# Patient Record
Sex: MALE | Race: WHITE | Employment: FULL TIME | URBAN - METROPOLITAN AREA
[De-identification: names, ages, dates, MRNs, and addresses within clinical notes are randomized per-mention and may not be internally consistent; named-entity substitution may affect disease eponyms.]

---

## 2023-10-31 ENCOUNTER — HOSPITAL ENCOUNTER (EMERGENCY)
Age: 28
Discharge: HOME | End: 2023-11-01
Attending: EMERGENCY MEDICINE
Payer: COMMERCIAL
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
## 2023-10-31 DIAGNOSIS — S61.309A AVULSION OF FINGERNAIL, INITIAL ENCOUNTER: ICD-10-CM

## 2023-10-31 PROCEDURE — 99284 EMERGENCY DEPT VISIT MOD MDM: CPT

## 2023-10-31 PROCEDURE — 12001 RPR S/N/AX/GEN/TRNK 2.5CM/<: CPT

## 2023-11-01 ENCOUNTER — APPOINTMENT (OUTPATIENT)
Dept: GENERAL RADIOLOGY | Age: 28
End: 2023-11-01
Payer: COMMERCIAL

## 2023-11-01 VITALS
HEIGHT: 71 IN | DIASTOLIC BLOOD PRESSURE: 84 MMHG | HEART RATE: 100 BPM | WEIGHT: 158 LBS | RESPIRATION RATE: 20 BRPM | TEMPERATURE: 98.3 F | BODY MASS INDEX: 22.12 KG/M2 | OXYGEN SATURATION: 100 % | SYSTOLIC BLOOD PRESSURE: 135 MMHG

## 2023-11-01 PROCEDURE — 90715 TDAP VACCINE 7 YRS/> IM: CPT | Performed by: STUDENT IN AN ORGANIZED HEALTH CARE EDUCATION/TRAINING PROGRAM

## 2023-11-01 PROCEDURE — 2500000003 HC RX 250 WO HCPCS: Performed by: STUDENT IN AN ORGANIZED HEALTH CARE EDUCATION/TRAINING PROGRAM

## 2023-11-01 PROCEDURE — 73130 X-RAY EXAM OF HAND: CPT

## 2023-11-01 PROCEDURE — 90471 IMMUNIZATION ADMIN: CPT | Performed by: STUDENT IN AN ORGANIZED HEALTH CARE EDUCATION/TRAINING PROGRAM

## 2023-11-01 PROCEDURE — 6370000000 HC RX 637 (ALT 250 FOR IP): Performed by: STUDENT IN AN ORGANIZED HEALTH CARE EDUCATION/TRAINING PROGRAM

## 2023-11-01 PROCEDURE — 6360000002 HC RX W HCPCS: Performed by: STUDENT IN AN ORGANIZED HEALTH CARE EDUCATION/TRAINING PROGRAM

## 2023-11-01 RX ORDER — ACETAMINOPHEN 500 MG
1000 TABLET ORAL
Status: COMPLETED | OUTPATIENT
Start: 2023-11-01 | End: 2023-11-01

## 2023-11-01 RX ORDER — AMOXICILLIN AND CLAVULANATE POTASSIUM 875; 125 MG/1; MG/1
1 TABLET, FILM COATED ORAL 2 TIMES DAILY
Qty: 14 TABLET | Refills: 0 | Status: SHIPPED | OUTPATIENT
Start: 2023-11-01 | End: 2023-11-08

## 2023-11-01 RX ORDER — LIDOCAINE HYDROCHLORIDE 10 MG/ML
20 INJECTION, SOLUTION INFILTRATION; PERINEURAL ONCE
Status: COMPLETED | OUTPATIENT
Start: 2023-11-01 | End: 2023-11-01

## 2023-11-01 RX ORDER — SERTRALINE HYDROCHLORIDE 100 MG/1
200 TABLET, FILM COATED ORAL DAILY
COMMUNITY

## 2023-11-01 RX ORDER — AMOXICILLIN AND CLAVULANATE POTASSIUM 875; 125 MG/1; MG/1
1 TABLET, FILM COATED ORAL ONCE
Status: COMPLETED | OUTPATIENT
Start: 2023-11-01 | End: 2023-11-01

## 2023-11-01 RX ADMIN — Medication 3 ML: at 01:18

## 2023-11-01 RX ADMIN — LIDOCAINE HYDROCHLORIDE 20 ML: 10 INJECTION, SOLUTION INFILTRATION; PERINEURAL at 00:34

## 2023-11-01 RX ADMIN — TETANUS TOXOID, REDUCED DIPHTHERIA TOXOID AND ACELLULAR PERTUSSIS VACCINE, ADSORBED 0.5 ML: 5; 2.5; 8; 8; 2.5 SUSPENSION INTRAMUSCULAR at 00:14

## 2023-11-01 RX ADMIN — ACETAMINOPHEN 1000 MG: 500 TABLET ORAL at 00:09

## 2023-11-01 RX ADMIN — AMOXICILLIN AND CLAVULANATE POTASSIUM 1 TABLET: 875; 125 TABLET, FILM COATED ORAL at 02:09

## 2023-11-01 ASSESSMENT — PAIN DESCRIPTION - ORIENTATION
ORIENTATION: RIGHT;LEFT
ORIENTATION: RIGHT;LEFT

## 2023-11-01 ASSESSMENT — PAIN DESCRIPTION - DESCRIPTORS
DESCRIPTORS: ACHING
DESCRIPTORS: ACHING
DESCRIPTORS: THROBBING

## 2023-11-01 ASSESSMENT — PAIN DESCRIPTION - LOCATION
LOCATION: HAND

## 2023-11-01 ASSESSMENT — PAIN SCALES - GENERAL
PAINLEVEL_OUTOF10: 7
PAINLEVEL_OUTOF10: 8
PAINLEVEL_OUTOF10: 7

## 2023-11-01 ASSESSMENT — PAIN DESCRIPTION - PAIN TYPE: TYPE: ACUTE PAIN

## 2023-11-01 NOTE — ED NOTES
Sw provided pt and significant other with [employer] phone number and they are arranging their own transportation due to pt living across state lines and not having the proper insurance.       Issa Ortiz, [facility]  11/01/23 2548

## 2023-11-01 NOTE — ED TRIAGE NOTES
Pt presents to the ED via [facility] with c/o of dog bite. Pt has bites to bilateral hands. Pt states he smokes marijuana just prior to incident happening. Pt states pain is 6/10. Pt place on full cardiac monitor,  Call light in reach, white board updated.

## 2023-11-01 NOTE — ED PROVIDER NOTES
[address] ED  Emergency Department Encounter  Emergency Medicine Resident     Pt Name:Justin Jessica  MRN: [id number]  [facility] 1995  Date of evaluation: 11/1/23  PCP:  No primary care provider on file. Note Started: 12:23 AM EDT      CHIEF COMPLAINT       Chief Complaint   Patient presents with    Animal Bite     Pt's dog        HISTORY OF PRESENT ILLNESS  (Location/Symptom, Timing/Onset, Context/Setting, Quality, Duration, Modifying Factors, Severity.)      Saira Estrada is a 32 y.o. male who presents via EMS for a dog bite. Patient reports he was bitten by his dog on his left and right hands. He reports that the dog is well-known to him and they rescued him over a year ago. Report patient's dog has been vaccinated in the past for rabies but they are not sure when. Patient reports pain is more severe in the left hand and he is left-hand-dominant. He reports that he put a tourniquet on his left arm because it was bleeding excessively. Upon EMS review, bleeding could be stopped with some pressure. Patient reports he is on sure when his last tetanus was. Patient was tachycardic upon initial evaluation by EMS but has improved somewhat. Patient reports that he did use some marijuana prior to coming in. PAST MEDICAL / SURGICAL / SOCIAL / FAMILY HISTORY      has no past medical history on file. Anxiety     has no past surgical history on file.     Social History     Socioeconomic History    Marital status:      Spouse name: Not on file    Number of children: Not on file    Years of education: Not on file    Highest education level: Not on file   Occupational History    Not on file   Tobacco Use    Smoking status: Every Day     Types: E-Cigarettes    Smokeless tobacco: Not on file   Substance and Sexual Activity    Alcohol use: Not Currently    Drug use: Yes     Frequency: 3.0 times per week     Types: Marijuana Fernando Horowitz)    Sexual activity: Not on file   Other Topics Concern

## 2023-11-01 NOTE — ED NOTES
Dog bite form faxed to [address], [address] [facility],[address], [address]  11/01/23 0734

## 2023-11-01 NOTE — DISCHARGE INSTRUCTIONS
You were evaluated due to a dog bite. You had torn part of your nail off and we replaced it. We also placed 4 stitches that will need to be removed by either your primary care physician or at an urgent care or at an ED in the next 5 days. We have prescribed antibiotics that should take for the entire course. There is a high risk of infection and wounds in the hand and we advised that you do not submerge your hands in water until the skin closes. You may wash your hands with clean sources of water but we do not advise submerging your hands in any unclean water sources. Please return to the ED if you develop any signs of infection such as difficulty moving your fingers, increased warmth, swelling, pain, fevers, chills or for any other concern.

## 2023-11-15 ENCOUNTER — HOSPITAL ENCOUNTER (EMERGENCY)
Age: 28
Discharge: HOME | End: 2023-11-15
Attending: EMERGENCY MEDICINE
Payer: COMMERCIAL

## 2023-11-15 ENCOUNTER — APPOINTMENT (OUTPATIENT)
Dept: GENERAL RADIOLOGY | Age: 28
End: 2023-11-15
Payer: COMMERCIAL

## 2023-11-15 VITALS
HEART RATE: 86 BPM | RESPIRATION RATE: 18 BRPM | WEIGHT: 161.6 LBS | BODY MASS INDEX: 22.54 KG/M2 | DIASTOLIC BLOOD PRESSURE: 97 MMHG | SYSTOLIC BLOOD PRESSURE: 151 MMHG | OXYGEN SATURATION: 97 % | TEMPERATURE: 98.6 F
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
## 2023-11-15 PROCEDURE — 73080 X-RAY EXAM OF ELBOW: CPT

## 2023-11-15 PROCEDURE — 12002 RPR S/N/AX/GEN/TRNK2.6-7.5CM: CPT | Performed by: EMERGENCY MEDICINE

## 2023-11-15 PROCEDURE — 99283 EMERGENCY DEPT VISIT LOW MDM: CPT | Performed by: EMERGENCY MEDICINE

## 2023-11-15 PROCEDURE — 6370000000 HC RX 637 (ALT 250 FOR IP)

## 2023-11-15 RX ORDER — AMOXICILLIN AND CLAVULANATE POTASSIUM 875; 125 MG/1; MG/1
1 TABLET, FILM COATED ORAL 2 TIMES DAILY
Qty: 13 TABLET | Refills: 0 | Status: SHIPPED | OUTPATIENT
Start: 2023-11-15 | End: 2023-11-22

## 2023-11-15 RX ORDER — AMOXICILLIN AND CLAVULANATE POTASSIUM 875; 125 MG/1; MG/1
1 TABLET, FILM COATED ORAL ONCE
Status: COMPLETED | OUTPATIENT
Start: 2023-11-15 | End: 2023-11-15

## 2023-11-15 RX ADMIN — AMOXICILLIN AND CLAVULANATE POTASSIUM 1 TABLET: 875; 125 TABLET, FILM COATED ORAL at 22:47

## 2023-11-15 ASSESSMENT — ENCOUNTER SYMPTOMS
VOMITING: 0
NAUSEA: 0

## 2023-11-15 ASSESSMENT — PAIN DESCRIPTION - LOCATION: LOCATION: LEG;ARM

## 2023-11-15 ASSESSMENT — PAIN - FUNCTIONAL ASSESSMENT: PAIN_FUNCTIONAL_ASSESSMENT: 0-10

## 2023-11-15 ASSESSMENT — PAIN SCALES - GENERAL: PAINLEVEL_OUTOF10: 8

## 2023-11-16 NOTE — DISCHARGE INSTRUCTIONS
You were seen here for dog bite. We have evaluated you and found that you need a stitch on one of the bites. We have also provided Steri-Strips to that area. We are leaving the wounds open in order for healing and to prevent infection. We have covered the wounds with sterile gauze and wrap. We have given you 1 dose of Augmentin here. Please take the entire course of your Augmentin. Please return to the emergency department for any new or worsening symptoms as described below. Please follow-up with your primary care provider. If you do not have one, we have provided the number for [facility].

## 2023-11-16 NOTE — ED NOTES
Pt presents to the ED with c/o a dog bite. Pt states he attempted to pet his near 1 y.o pit/boxer mix when he was bitten multiple times. Pt has puncture terry to his R elbow and L thigh. Bleeding is controlled. Pt's dog is vaccinated, including against rabies. Pt is in no distress, RR even and unlabored. Call light within reach. Animal bite form filled out and faxed to the [address] [place] [address] Dpt.       Moreno Padilla RN  11/15/23 2255

## 2024-05-01 ENCOUNTER — APPOINTMENT (OUTPATIENT)
Dept: CT IMAGING | Age: 29
End: 2024-05-01
Payer: COMMERCIAL

## 2024-05-01 ENCOUNTER — APPOINTMENT (OUTPATIENT)
Dept: GENERAL RADIOLOGY | Age: 29
End: 2024-05-01
Payer: COMMERCIAL

## 2024-05-01 ENCOUNTER — HOSPITAL ENCOUNTER (EMERGENCY)
Age: 29
Discharge: HOME | End: 2024-05-02
Attending: STUDENT IN AN ORGANIZED HEALTH CARE EDUCATION/TRAINING PROGRAM
Payer: COMMERCIAL

## 2024-05-01 VITALS
WEIGHT: 164 LBS | HEART RATE: 91 BPM | BODY MASS INDEX: 22.96 KG/M2 | OXYGEN SATURATION: 98 % | SYSTOLIC BLOOD PRESSURE: 127 MMHG | DIASTOLIC BLOOD PRESSURE: 95 MMHG | TEMPERATURE: 98 F | HEIGHT: 71 IN | RESPIRATION RATE: 12 BRPM

## 2024-05-01 DIAGNOSIS — S82.892A CLOSED FRACTURE OF LEFT ANKLE, INITIAL ENCOUNTER: Primary | ICD-10-CM

## 2024-05-01 PROCEDURE — 96372 THER/PROPH/DIAG INJ SC/IM: CPT

## 2024-05-01 PROCEDURE — 73700 CT LOWER EXTREMITY W/O DYE: CPT

## 2024-05-01 PROCEDURE — 73610 X-RAY EXAM OF ANKLE: CPT

## 2024-05-01 PROCEDURE — 27818 TREATMENT OF ANKLE FRACTURE: CPT

## 2024-05-01 PROCEDURE — 73630 X-RAY EXAM OF FOOT: CPT

## 2024-05-01 PROCEDURE — 6360000002 HC RX W HCPCS: Performed by: NURSE PRACTITIONER

## 2024-05-01 PROCEDURE — 99284 EMERGENCY DEPT VISIT MOD MDM: CPT

## 2024-05-01 RX ORDER — KETOROLAC TROMETHAMINE 30 MG/ML
30 INJECTION, SOLUTION INTRAMUSCULAR; INTRAVENOUS ONCE
Status: COMPLETED | OUTPATIENT
Start: 2024-05-01 | End: 2024-05-01

## 2024-05-01 RX ORDER — KETOROLAC TROMETHAMINE 10 MG/1
10 TABLET, FILM COATED ORAL EVERY 6 HOURS PRN
Qty: 20 TABLET | Refills: 0 | Status: SHIPPED | OUTPATIENT
Start: 2024-05-01 | End: 2024-05-06

## 2024-05-01 RX ORDER — ACETAMINOPHEN 500 MG
1000 TABLET ORAL 3 TIMES DAILY
Qty: 180 TABLET | Refills: 0 | Status: SHIPPED | OUTPATIENT
Start: 2024-05-01

## 2024-05-01 RX ADMIN — KETOROLAC TROMETHAMINE 30 MG: 30 INJECTION, SOLUTION INTRAMUSCULAR at 22:28

## 2024-05-01 ASSESSMENT — PAIN - FUNCTIONAL ASSESSMENT: PAIN_FUNCTIONAL_ASSESSMENT: 0-10

## 2024-05-01 ASSESSMENT — PAIN SCALES - GENERAL
PAINLEVEL_OUTOF10: 5
PAINLEVEL_OUTOF10: 4

## 2024-05-01 ASSESSMENT — PAIN DESCRIPTION - LOCATION
LOCATION: ANKLE
LOCATION: ANKLE

## 2024-05-01 ASSESSMENT — PAIN DESCRIPTION - DESCRIPTORS
DESCRIPTORS: ACHING;SHARP
DESCRIPTORS: DULL;ACHING

## 2024-05-01 ASSESSMENT — PAIN DESCRIPTION - PAIN TYPE: TYPE: ACUTE PAIN

## 2024-05-01 ASSESSMENT — PAIN DESCRIPTION - ORIENTATION
ORIENTATION: LEFT
ORIENTATION: LEFT

## 2024-05-01 ASSESSMENT — ENCOUNTER SYMPTOMS: COLOR CHANGE: 1

## 2024-05-01 ASSESSMENT — PAIN DESCRIPTION - FREQUENCY: FREQUENCY: CONTINUOUS

## 2024-05-02 ENCOUNTER — TELEPHONE (OUTPATIENT)
Dept: PODIATRY | Age: 29
End: 2024-05-02

## 2024-05-02 ENCOUNTER — ANESTHESIA EVENT (OUTPATIENT)
Dept: OPERATING ROOM | Age: 29
End: 2024-05-02
Payer: COMMERCIAL

## 2024-05-02 PROCEDURE — 2500000003 HC RX 250 WO HCPCS

## 2024-05-02 RX ADMIN — LIDOCAINE HYDROCHLORIDE 20 ML: 10 INJECTION, SOLUTION INFILTRATION; PERINEURAL at 00:00

## 2024-05-02 ASSESSMENT — ENCOUNTER SYMPTOMS
EYES NEGATIVE: 1
GASTROINTESTINAL NEGATIVE: 1
ALLERGIC/IMMUNOLOGIC NEGATIVE: 1
COLOR CHANGE: 1
RESPIRATORY NEGATIVE: 1

## 2024-05-02 NOTE — TELEPHONE ENCOUNTER
Dr. Jarvis spoke to patient to confirm sx; at [facility] on 5/3/2024 at 2:30 pm  and to arrive at 12:30 pm

## 2024-05-02 NOTE — ED PROVIDER NOTES
administration in time range)   ketorolac (TORADOL) injection 30 mg (30 mg IntraMUSCular Given 5/1/24 6918)       CLINICAL DECISION MAKING:  The patient presented alert with a nontoxic appearance and was seen in conjunction with Dr. Boston.     DDx include ankle dislocation, ankle fracture, ankle sprain      I will order   Orders Placed This Encounter   Procedures    XR ANKLE LEFT (MIN 3 VIEWS)     Standing Status:   Standing     Number of Occurrences:   1     Order Specific Question:   Reason for exam:     Answer:   Rolled left ankle today heard a pop, deformity    XR FOOT LEFT (MIN 3 VIEWS)     Standing Status:   Standing     Number of Occurrences:   1     Order Specific Question:   Reason for exam:     Answer:   fall    XR ANKLE LEFT (MIN 3 VIEWS)     Standing Status:   Standing     Number of Occurrences:   1     Order Specific Question:   Reason for exam:     Answer:   post reduction    CT ANKLE LEFT WO CONTRAST     Standing Status:   Standing     Number of Occurrences:   1     Order Specific Question:   Reason for exam:     Answer:   trimalleolar ankle fracture     Order Specific Question:   Decision Support Exception - unselect if not a suspected or confirmed emergency medical condition     Answer:   Emergency Medical Condition (MA) [1]    Call Doctor     Place call to podiatry resident     Standing Status:   Standing     Number of Occurrences:   1    [employer] Crutches; Pair, Left Side Injury; Tall (5'10\"-6'6\")     Standing Status:   Standing     Number of Occurrences:   1     Order Specific Question:   Equipment:     Answer:   Crutches     Order Specific Question:   Laterality:     Answer:   Pair, Left Side Injury     Order Specific Question:   Type of Crutch     Answer:   Tall (5'10\"-6'6\")        Test considered, but not ordered: None    The patient was involved in his/her plan of care through shared decision making. The testing that was ordered was discussed with the patient. Any

## 2024-05-02 NOTE — CONSULTS
Severe edema is present to the left lower extremity.    Neuro: Saph/sural/SP/DP/plantar SILT.  Denies shooting pain up the lateral ankle.  Light touch sensation is intact to the foot.    Musculoskeletal: Muscle strength is deferred to all lower extremity muscle groups due to pain in the left lower extremity.  severe tenderness upon palpation of the medial and lateral ankle gutters. STJ ROM is painful with passive inversion and eversion. Palpation of the distal aspect of the lateral malleolus is painful. Palpation of the distal aspect of the medial malleolus is painful. no tenderness to palpation of the dorsal aspect of the forefoot. Gross deformity is present to the ankle with the foot pronated and externally rotated in relationship to the ankle joint.  No skin tenting is noted medially.  Ecchymosis present over the medial malleolus.  No high fibular pain is noted, no pain to the syndesmosis is noted.         Dermatologic: No fractures blisters. No open fractures or other wounds noted, bilateral.  Other dermatologic findings noted above.  Skin tenting is noted to the medial malleolus with bruising very prominently noted.  There is diffuse increase in warmth throughout the left lower extremity.          Imaging:     Views: 3 NWB views of the left ankle joint of a skeletally mature individual showing posterior displacement of the talus in relationship to the tibia.  There is also a valgus component to the dome of the talus relationship to the ankle.  Displaced posterior malleolus fracture is noted with displacement of the medial malleolus fragment.  On the lateral it is noted that there is fracture of the lateral malleolus which is minimally displaced.    Impression:     \\  XR FOOT LEFT (MIN 3 VIEWS)   Final Result   Acute left ankle fracture-dislocation as detailed above.  No acute osseous   abnormality of the left foot.         XR ANKLE LEFT (MIN 3 VIEWS)   Final Result   Acute left ankle fracture-dislocation as

## 2024-05-02 NOTE — TELEPHONE ENCOUNTER
Dr. Jarvis spoke to patient to confirm sx; at [facility] on 5/3/2024 at 2:30 pm  and to arrive at 12:30 pm

## 2024-05-03 ENCOUNTER — APPOINTMENT (OUTPATIENT)
Dept: GENERAL RADIOLOGY | Age: 29
End: 2024-05-03
Attending: PODIATRIST
Payer: COMMERCIAL

## 2024-05-03 ENCOUNTER — ANESTHESIA (OUTPATIENT)
Dept: OPERATING ROOM | Age: 29
End: 2024-05-03
Payer: COMMERCIAL

## 2024-05-03 ENCOUNTER — HOSPITAL ENCOUNTER (OUTPATIENT)
Age: 29
Setting detail: OUTPATIENT SURGERY
Discharge: HOME | End: 2024-05-03
Attending: PODIATRIST | Admitting: PODIATRIST
Payer: COMMERCIAL

## 2024-05-03 VITALS
SYSTOLIC BLOOD PRESSURE: 131 MMHG | OXYGEN SATURATION: 96 % | TEMPERATURE: 98.1 F | RESPIRATION RATE: 28 BRPM | DIASTOLIC BLOOD PRESSURE: 76 MMHG | HEART RATE: 106 BPM | BODY MASS INDEX: 22.96 KG/M2 | HEIGHT: 71 IN | WEIGHT: 164 LBS

## 2024-05-03 DIAGNOSIS — Z98.890 POST-OPERATIVE STATE: Primary | ICD-10-CM

## 2024-05-03 PROCEDURE — 2500000003 HC RX 250 WO HCPCS: Performed by: NURSE ANESTHETIST, CERTIFIED REGISTERED

## 2024-05-03 PROCEDURE — 3600000003 HC SURGERY LEVEL 3 BASE: Performed by: PODIATRIST

## 2024-05-03 PROCEDURE — 3700000001 HC ADD 15 MINUTES (ANESTHESIA): Performed by: PODIATRIST

## 2024-05-03 PROCEDURE — 2580000003 HC RX 258: Performed by: NURSE ANESTHETIST, CERTIFIED REGISTERED

## 2024-05-03 PROCEDURE — 29898 ANKLE ARTHROSCOPY/SURGERY: CPT | Performed by: PODIATRIST

## 2024-05-03 PROCEDURE — 2720000010 HC SURG SUPPLY STERILE: Performed by: PODIATRIST

## 2024-05-03 PROCEDURE — 7100000000 HC PACU RECOVERY - FIRST 15 MIN: Performed by: PODIATRIST

## 2024-05-03 PROCEDURE — C1713 ANCHOR/SCREW BN/BN,TIS/BN: HCPCS | Performed by: PODIATRIST

## 2024-05-03 PROCEDURE — 64447 NJX AA&/STRD FEMORAL NRV IMG: CPT | Performed by: ANESTHESIOLOGY

## 2024-05-03 PROCEDURE — 3600000013 HC SURGERY LEVEL 3 ADDTL 15MIN: Performed by: PODIATRIST

## 2024-05-03 PROCEDURE — 2580000003 HC RX 258: Performed by: ANESTHESIOLOGY

## 2024-05-03 PROCEDURE — 7100000010 HC PHASE II RECOVERY - FIRST 15 MIN: Performed by: PODIATRIST

## 2024-05-03 PROCEDURE — 7100000001 HC PACU RECOVERY - ADDTL 15 MIN: Performed by: PODIATRIST

## 2024-05-03 PROCEDURE — 6360000002 HC RX W HCPCS: Performed by: NURSE ANESTHETIST, CERTIFIED REGISTERED

## 2024-05-03 PROCEDURE — 6360000002 HC RX W HCPCS: Performed by: ANESTHESIOLOGY

## 2024-05-03 PROCEDURE — 7100000011 HC PHASE II RECOVERY - ADDTL 15 MIN: Performed by: PODIATRIST

## 2024-05-03 PROCEDURE — 6360000002 HC RX W HCPCS: Performed by: PODIATRIST

## 2024-05-03 PROCEDURE — 3700000000 HC ANESTHESIA ATTENDED CARE: Performed by: PODIATRIST

## 2024-05-03 PROCEDURE — 27822 TREATMENT OF ANKLE FRACTURE: CPT | Performed by: PODIATRIST

## 2024-05-03 PROCEDURE — 73610 X-RAY EXAM OF ANKLE: CPT

## 2024-05-03 PROCEDURE — 2709999900 HC NON-CHARGEABLE SUPPLY: Performed by: PODIATRIST

## 2024-05-03 PROCEDURE — 27829 TREAT LOWER LEG JOINT: CPT | Performed by: PODIATRIST

## 2024-05-03 PROCEDURE — C1769 GUIDE WIRE: HCPCS | Performed by: PODIATRIST

## 2024-05-03 DEVICE — LOCKING DISTAL FIBULA PLATE, TI ,LEFT,5H
Type: IMPLANTABLE DEVICE | Site: ANKLE | Status: FUNCTIONAL
Brand: ARTHREX®

## 2024-05-03 DEVICE — BB-TAK
Type: IMPLANTABLE DEVICE | Site: ANKLE | Status: FUNCTIONAL
Brand: ARTHREX®

## 2024-05-03 DEVICE — IMPLANTABLE DEVICE: Type: IMPLANTABLE DEVICE | Site: ANKLE | Status: FUNCTIONAL

## 2024-05-03 DEVICE — LO-PRO SCRW,TI,3.5MMX 16MM
Type: IMPLANTABLE DEVICE | Site: ANKLE | Status: FUNCTIONAL
Brand: ARTHREX®

## 2024-05-03 DEVICE — QCKFIX SCRW,TI,CANN ST,CANC.,4.0X 30MM
Type: IMPLANTABLE DEVICE | Site: ANKLE | Status: FUNCTIONAL
Brand: ARTHREX®

## 2024-05-03 DEVICE — SYSTEM IMPL TI UHMWPE KNOTLESS FOR SYNDESMOSIS FIX: Type: IMPLANTABLE DEVICE | Site: ANKLE | Status: FUNCTIONAL

## 2024-05-03 DEVICE — LO-PRO SCRW,TI,3.5MMX 14MM
Type: IMPLANTABLE DEVICE | Site: ANKLE | Status: FUNCTIONAL
Brand: ARTHREX®

## 2024-05-03 DEVICE — SCREW VAL KREULOCK T1 3.0 X 14: Type: IMPLANTABLE DEVICE | Site: ANKLE | Status: FUNCTIONAL

## 2024-05-03 RX ORDER — FENTANYL CITRATE 50 UG/ML
INJECTION, SOLUTION INTRAMUSCULAR; INTRAVENOUS PRN
Status: DISCONTINUED | OUTPATIENT
Start: 2024-05-03 | End: 2024-05-03 | Stop reason: SDUPTHER

## 2024-05-03 RX ORDER — SODIUM CHLORIDE, SODIUM LACTATE, POTASSIUM CHLORIDE, CALCIUM CHLORIDE 600; 310; 30; 20 MG/100ML; MG/100ML; MG/100ML; MG/100ML
INJECTION, SOLUTION INTRAVENOUS CONTINUOUS PRN
Status: DISCONTINUED | OUTPATIENT
Start: 2024-05-03 | End: 2024-05-03 | Stop reason: SDUPTHER

## 2024-05-03 RX ORDER — KETOROLAC TROMETHAMINE 30 MG/ML
INJECTION, SOLUTION INTRAMUSCULAR; INTRAVENOUS PRN
Status: DISCONTINUED | OUTPATIENT
Start: 2024-05-03 | End: 2024-05-03 | Stop reason: SDUPTHER

## 2024-05-03 RX ORDER — LIDOCAINE HYDROCHLORIDE 10 MG/ML
1 INJECTION, SOLUTION EPIDURAL; INFILTRATION; INTRACAUDAL; PERINEURAL
Status: DISCONTINUED | OUTPATIENT
Start: 2024-05-04 | End: 2024-05-03 | Stop reason: HOSPADM

## 2024-05-03 RX ORDER — MIDAZOLAM HYDROCHLORIDE 1 MG/ML
INJECTION INTRAMUSCULAR; INTRAVENOUS PRN
Status: DISCONTINUED | OUTPATIENT
Start: 2024-05-03 | End: 2024-05-03 | Stop reason: SDUPTHER

## 2024-05-03 RX ORDER — SODIUM CHLORIDE 0.9 % (FLUSH) 0.9 %
5-40 SYRINGE (ML) INJECTION PRN
Status: DISCONTINUED | OUTPATIENT
Start: 2024-05-03 | End: 2024-05-03 | Stop reason: HOSPADM

## 2024-05-03 RX ORDER — LIDOCAINE HYDROCHLORIDE 20 MG/ML
INJECTION, SOLUTION EPIDURAL; INFILTRATION; INTRACAUDAL; PERINEURAL PRN
Status: DISCONTINUED | OUTPATIENT
Start: 2024-05-03 | End: 2024-05-03 | Stop reason: SDUPTHER

## 2024-05-03 RX ORDER — SODIUM CHLORIDE 9 MG/ML
INJECTION, SOLUTION INTRAVENOUS PRN
Status: DISCONTINUED | OUTPATIENT
Start: 2024-05-03 | End: 2024-05-03 | Stop reason: HOSPADM

## 2024-05-03 RX ORDER — HYDROMORPHONE HYDROCHLORIDE 1 MG/ML
0.5 INJECTION, SOLUTION INTRAMUSCULAR; INTRAVENOUS; SUBCUTANEOUS EVERY 5 MIN PRN
Status: DISCONTINUED | OUTPATIENT
Start: 2024-05-03 | End: 2024-05-03 | Stop reason: HOSPADM

## 2024-05-03 RX ORDER — DEXAMETHASONE SODIUM PHOSPHATE 10 MG/ML
INJECTION, SOLUTION INTRAMUSCULAR; INTRAVENOUS PRN
Status: DISCONTINUED | OUTPATIENT
Start: 2024-05-03 | End: 2024-05-03 | Stop reason: SDUPTHER

## 2024-05-03 RX ORDER — ONDANSETRON 2 MG/ML
4 INJECTION INTRAMUSCULAR; INTRAVENOUS
Status: DISCONTINUED | OUTPATIENT
Start: 2024-05-03 | End: 2024-05-03 | Stop reason: HOSPADM

## 2024-05-03 RX ORDER — NALOXONE HYDROCHLORIDE 0.4 MG/ML
INJECTION, SOLUTION INTRAMUSCULAR; INTRAVENOUS; SUBCUTANEOUS PRN
Status: DISCONTINUED | OUTPATIENT
Start: 2024-05-03 | End: 2024-05-03 | Stop reason: HOSPADM

## 2024-05-03 RX ORDER — ONDANSETRON 2 MG/ML
INJECTION INTRAMUSCULAR; INTRAVENOUS PRN
Status: DISCONTINUED | OUTPATIENT
Start: 2024-05-03 | End: 2024-05-03 | Stop reason: SDUPTHER

## 2024-05-03 RX ORDER — SODIUM CHLORIDE 9 MG/ML
INJECTION, SOLUTION INTRAVENOUS CONTINUOUS
Status: DISCONTINUED | OUTPATIENT
Start: 2024-05-03 | End: 2024-05-03

## 2024-05-03 RX ORDER — SODIUM CHLORIDE 0.9 % (FLUSH) 0.9 %
5-40 SYRINGE (ML) INJECTION EVERY 12 HOURS SCHEDULED
Status: DISCONTINUED | OUTPATIENT
Start: 2024-05-03 | End: 2024-05-03 | Stop reason: HOSPADM

## 2024-05-03 RX ORDER — MIDAZOLAM HYDROCHLORIDE 1 MG/ML
2 INJECTION INTRAMUSCULAR; INTRAVENOUS ONCE
Status: COMPLETED | OUTPATIENT
Start: 2024-05-03 | End: 2024-05-03

## 2024-05-03 RX ORDER — ROCURONIUM BROMIDE 10 MG/ML
INJECTION, SOLUTION INTRAVENOUS PRN
Status: DISCONTINUED | OUTPATIENT
Start: 2024-05-03 | End: 2024-05-03 | Stop reason: SDUPTHER

## 2024-05-03 RX ORDER — SODIUM CHLORIDE, SODIUM LACTATE, POTASSIUM CHLORIDE, CALCIUM CHLORIDE 600; 310; 30; 20 MG/100ML; MG/100ML; MG/100ML; MG/100ML
INJECTION, SOLUTION INTRAVENOUS CONTINUOUS
Status: DISCONTINUED | OUTPATIENT
Start: 2024-05-03 | End: 2024-05-03 | Stop reason: HOSPADM

## 2024-05-03 RX ORDER — FENTANYL CITRATE 50 UG/ML
25 INJECTION, SOLUTION INTRAMUSCULAR; INTRAVENOUS EVERY 5 MIN PRN
Status: DISCONTINUED | OUTPATIENT
Start: 2024-05-03 | End: 2024-05-03 | Stop reason: HOSPADM

## 2024-05-03 RX ORDER — BUPIVACAINE HYDROCHLORIDE 5 MG/ML
INJECTION, SOLUTION EPIDURAL; INTRACAUDAL
Status: COMPLETED | OUTPATIENT
Start: 2024-05-03 | End: 2024-05-03

## 2024-05-03 RX ORDER — PROPOFOL 10 MG/ML
INJECTION, EMULSION INTRAVENOUS PRN
Status: DISCONTINUED | OUTPATIENT
Start: 2024-05-03 | End: 2024-05-03 | Stop reason: SDUPTHER

## 2024-05-03 RX ORDER — IBUPROFEN 800 MG/1
800 TABLET ORAL EVERY 8 HOURS PRN
Qty: 90 TABLET | Refills: 0 | Status: SHIPPED | OUTPATIENT
Start: 2024-05-07 | End: 2024-06-06

## 2024-05-03 RX ADMIN — SODIUM CHLORIDE, POTASSIUM CHLORIDE, SODIUM LACTATE AND CALCIUM CHLORIDE: 600; 310; 30; 20 INJECTION, SOLUTION INTRAVENOUS at 16:32

## 2024-05-03 RX ADMIN — BUPIVACAINE HYDROCHLORIDE 35 ML: 5 INJECTION, SOLUTION EPIDURAL; INTRACAUDAL; PERINEURAL at 14:27

## 2024-05-03 RX ADMIN — KETOROLAC TROMETHAMINE 30 MG: 30 INJECTION, SOLUTION INTRAMUSCULAR; INTRAVENOUS at 18:04

## 2024-05-03 RX ADMIN — SODIUM CHLORIDE, POTASSIUM CHLORIDE, SODIUM LACTATE AND CALCIUM CHLORIDE: 600; 310; 30; 20 INJECTION, SOLUTION INTRAVENOUS at 13:59

## 2024-05-03 RX ADMIN — DEXAMETHASONE SODIUM PHOSPHATE 10 MG: 10 INJECTION INTRAMUSCULAR; INTRAVENOUS at 16:06

## 2024-05-03 RX ADMIN — SODIUM CHLORIDE, POTASSIUM CHLORIDE, SODIUM LACTATE AND CALCIUM CHLORIDE: 600; 310; 30; 20 INJECTION, SOLUTION INTRAVENOUS at 15:54

## 2024-05-03 RX ADMIN — LIDOCAINE HYDROCHLORIDE 100 MG: 20 INJECTION, SOLUTION EPIDURAL; INFILTRATION; INTRACAUDAL; PERINEURAL at 15:59

## 2024-05-03 RX ADMIN — FENTANYL CITRATE 50 MCG: 50 INJECTION INTRAMUSCULAR; INTRAVENOUS at 18:19

## 2024-05-03 RX ADMIN — ONDANSETRON 4 MG: 2 INJECTION INTRAMUSCULAR; INTRAVENOUS at 18:04

## 2024-05-03 RX ADMIN — MIDAZOLAM 2 MG: 1 INJECTION INTRAMUSCULAR; INTRAVENOUS at 14:27

## 2024-05-03 RX ADMIN — PROPOFOL 200 MG: 10 INJECTION, EMULSION INTRAVENOUS at 15:59

## 2024-05-03 RX ADMIN — MIDAZOLAM 2 MG: 1 INJECTION INTRAMUSCULAR; INTRAVENOUS at 15:54

## 2024-05-03 RX ADMIN — SUGAMMADEX 200 MG: 100 INJECTION, SOLUTION INTRAVENOUS at 18:19

## 2024-05-03 RX ADMIN — Medication 2000 MG: at 16:05

## 2024-05-03 RX ADMIN — ROCURONIUM BROMIDE 50 MG: 10 SOLUTION INTRAVENOUS at 15:59

## 2024-05-03 RX ADMIN — FENTANYL CITRATE 50 MCG: 50 INJECTION INTRAMUSCULAR; INTRAVENOUS at 15:59

## 2024-05-03 ASSESSMENT — PAIN DESCRIPTION - LOCATION: LOCATION: ANKLE

## 2024-05-03 ASSESSMENT — PAIN SCALES - GENERAL: PAINLEVEL_OUTOF10: 3

## 2024-05-03 ASSESSMENT — LIFESTYLE VARIABLES: SMOKING_STATUS: 1

## 2024-05-03 ASSESSMENT — PAIN DESCRIPTION - ORIENTATION: ORIENTATION: LEFT

## 2024-05-03 ASSESSMENT — PAIN - FUNCTIONAL ASSESSMENT: PAIN_FUNCTIONAL_ASSESSMENT: NONE - DENIES PAIN

## 2024-05-03 ASSESSMENT — PAIN DESCRIPTION - DESCRIPTORS: DESCRIPTORS: ACHING;DULL

## 2024-05-03 NOTE — PLAN OF CARE
Patient was prescribed Premier Pro Aluminum Crutches.  This mobility device is required for the following reasons:  Patient has mobility limitations that significantly impair their ability to participate in one or more mobility related activities of daily living.  The patient is able to safely use the mobility device.  Functional mobility deficit can be sufficiently resolved with the use of this device.    Verbal and written instructions for the use of and application of this item were provided.  The patient was educated and fit by a healthcare professional with expert knowledge and specialization in brace application while under the direct supervision of the treating physician. They were instructed to contact the office immediately should the equipment result in increased pain, decreased sensation, increased swelling or worsening of the condition.

## 2024-05-03 NOTE — H&P
Interval H&P Note    Pt Name: Justin Smart  MRN: [id number]  YOB: 1995  Date of evaluation: 5/3/2024      [x] I have reviewed in epic the Podiatry Note by Dr Adrianna Duncan dated 5/1/24 attached below for the Interval History and Physical note.     [x] I have examined  Justin Smart, a 28 y.o.healthy male who arrived for his scheduled LEFT ANKLE  ORIF  POSSIBLE LEFT ANKLE SYNDESMOSIS ORIF, LEFT ANKLE ARTHROSCOPY WITH EXTENSIVE DEBRIDEMENT by Alvin Ireland DPM for Closed trimalleolar fracture of left ankle, initial encounter. The patient denies new health changes, fever, chills, wheezing, cough, increased SOB, chest pain, open sores or wounds.  No DM     Past Medical History:     Past Medical History:   Diagnosis Date    Anxiety     Asthma     Depression         Past Surgical History:     Past Surgical History:   Procedure Laterality Date    WISDOM TOOTH EXTRACTION          Social History:     Tobacco:    reports that he has been smoking e-cigarettes. He has quit using smokeless tobacco.  Alcohol:      reports that he does not currently use alcohol.  Drug Use:  reports current drug use. Frequency: 3.00 times per week. Drug: Marijuana (Weed).    Family History:     History reviewed. No pertinent family history.    Vital signs: /82   Pulse 90   Temp 97.7 °F (36.5 °C)   Resp 16   Ht 1.803 m (5' 11\")   Wt 74.4 kg (164 lb)   SpO2 100%   BMI 22.87 kg/m²     Allergies:  Patient has no known allergies.    Medications:    Prior to Admission medications    Medication Sig Start Date End Date Taking? Authorizing Provider   ketorolac (TORADOL) 10 MG tablet Take 1 tablet by mouth every 6 hours as needed for Pain (Take no more than 5 days) 5/1/24 5/6/24  Adrianna Tejada DPM   acetaminophen (TYLENOL) 500 MG tablet Take 2 tablets by mouth 3 times daily 5/1/24   Adrianna Tejada DPM   sertraline (ZOLOFT) 100 MG tablet Take 2 tablets by mouth daily    Provider, MD Jose      Review of Systems:  * No active hospital problems. *  Resolved Problems:    * No resolved hospital problems. *        Plan      Patient examined and evaluated at bedside.   Treatment options discussed in detail with the patient.  Radiographs reviewed and discussed in detail with the patient. left ankle trimalleolar ankle fracture with posterior displacement.  CT imaging of the left lower extremity ordered and is pending.  Extensive discussion had with patient regarding the nature and etiology of this injury. We discussed both the surgical and non-surgical treatment options as well as all of the benefits and risks of each. It was discussed in detail with the patient that surgical intervention is necessary given the displacement of the fracture, risk of post traumatic arthritis, severe/yung instability of this injury. We recommend surgical intervention in order to stabilize the fracture and improve position for restoring anatomic alignment.  All questions were answered to the patients apparent satisfaction. The patient verbalized and demonstrates understanding. Patient is amenable to surgical intervention.  Close reduction of the left lower extremity under hematoma and ankle block with 15 cc of 1% lidocaine plain.  IV Dilaudid given for pain management.  Closed reduction was done, ankle joint was reduced and mortise was restored, confirmed with bedside XR showing the talus beneath the tibia and skin tenting improved.  Dressing applied to Left lower extremity: Modified Live compressive dressing with short leg plaster AO posterior splint with a sugar-tong.  NWB to Left lower extremity.  Discharge instructions given to the patient.   Discussed with Dr. Ireland.  Patient will schedule for follow up as outpatient..              Adrianna Tejada DPM  Foot and Ankle Surgery  [facility]  5/1/2024 at 11:43 PM             Cosigned by: Alvin Ireland DPM at 5/2/2024  8:01 AM     Revision Histor

## 2024-05-03 NOTE — PROGRESS NOTES
Bedside nerve block done at bedside per Dr. Durbin. Pt monitored per protocol and tolerated per protocol . Side rails up x 2 and given call light given to pt. Pt instructed to not get OOB and verbalized understanding

## 2024-05-03 NOTE — ANESTHESIA PRE PROCEDURE
Department of Anesthesiology  Preprocedure Note       Name:  Justin Smart   Age:  28 y.o.  :  1995                                          MRN:  [id number]         Date:  5/3/2024      Surgeon: Surgeon(s):  Alvin Ireland DPM    Procedure: Procedure(s):  LEFT ANKLE  ORIF  POSSIBLE LEFT ANKLE SYNDESMOSIS ORIF  LEFT ANKLE ARTHROSCOPY WITH EXTENSIVE DEBRIDEMENT    Medications prior to admission:   Prior to Admission medications    Medication Sig Start Date End Date Taking? Authorizing Provider   ketorolac (TORADOL) 10 MG tablet Take 1 tablet by mouth every 6 hours as needed for Pain (Take no more than 5 days) 24  Adrianna Tejada DPMIN   acetaminophen (TYLENOL) 500 MG tablet Take 2 tablets by mouth 3 times daily 24   RamAdrianna franks A, DPM   sertraline (ZOLOFT) 100 MG tablet Take 2 tablets by mouth daily    Provider, MD Jose       Current medications:    Current Facility-Administered Medications   Medication Dose Route Frequency Provider Last Rate Last Admin    [START ON 2024] lidocaine PF 1 % injection 1 mL  1 mL IntraDERmal Once PRN Luci Fisher MD        lactated ringers IV soln infusion   IntraVENous Continuous Luci Fisher  mL/hr at 24 1359 New Bag at 24 1359    sodium chloride flush 0.9 % injection 5-40 mL  5-40 mL IntraVENous 2 times per day Luci Fisher MD        sodium chloride flush 0.9 % injection 5-40 mL  5-40 mL IntraVENous PRN Luci Fisher MD        0.9 % sodium chloride infusion   IntraVENous PRN Luci Fisher MD        midazolam (VERSED) injection 2 mg  2 mg IntraVENous Once Albert Durbin MD        ceFAZolin (ANCEF) 2000 mg in 0.9% sodium chloride 50 mL IVPB  2,000 mg IntraVENous Once Alvin Ireland DPM           Allergies:  No Known Allergies    Problem List:  There is no problem list on file for this patient.      Past Medical History:        Diagnosis Date    Anxiety

## 2024-05-03 NOTE — BRIEF OP NOTE
PODIATRY BRIEF OP NOTE    PATIENT NAME: Justin Smart  YOB: 1995  -  28 y.o. male  MRN: [id number]  DATE: 5/3/2024  BILLING #: [id number]    Surgeon(s):  Alvin Ireland DPM     ASSISTANTS: Sylvain Morales DPM PGY3; Neha Brizuela PGY3    PRE-OP DIAGNOSIS:   Closed trimalleolar fracture; left ankle  Left ankle pain    POST-OP DIAGNOSIS: Same as above.    PROCEDURE:   ORIF of left ankle  Syndesmotic repair; left ankle  Arthroscopy of left ankle    ANESTHESIA: General    HEMOSTASIS: Pneumatic tourniquet to left thigh @ 300 mmHg for 120 minutes.    ESTIMATED BLOOD LOSS: Less than 25cc.    MATERIALS:   Implant Name Type Inv. Item Serial No.  Lot No. LRB No. Used Action   PLATE BONE 5 H LT DSTL FIB TI LCK - [id number]  PLATE BONE 5 H LT DSTL FIB TI LCK  [employer]  Left 1 Implanted   SCREW BNE L14MM DIA3.5MM DARLEEN TI ST NONLOCKING HD LO PROF - [id number]  SCREW BNE L14MM DIA3.5MM DARLEEN TI ST NONLOCKING HD LO PROF  [employer]  Left 2 Implanted   SCREW BNE L16MM DIA3.5MM ANK TI LO PROF - [id number]  SCREW BNE L16MM DIA3.5MM ANK TI LO PROF  [employer]  Left 1 Implanted   SCREW BNE L30MM DIA4MM TI ANCELMO PARTIALLY THRD LO PROF - [id number]  SCREW BNE L30MM DIA4MM TI ANCELMO PARTIALLY THRD LO PROF  [employer]  Left 1 Implanted   ANCHOR FIX DISP FOR ANK FRAC SYS BB-JONELLE - [id number]  ANCHOR FIX DISP FOR ANK FRAC SYS BB-JONELLE  [employer]  Left 2 Implanted   SCREW RANDY KREULOCK T1 3.0 X 14 - [id number]  SCREW RANDY KREULOCK T1 3.0 X 14  [employer]  Left 4 Implanted   COMPR FT SCRW 3.5 MINI 54MM - [id number]  COMPR FT SCRW 3.5 MINI 54MM  [employer]  Left 2 Implanted   SYSTEM IMPL TI UHMWPE KNOTLESS FOR SYNDESMOSIS FIX - [id number]  SYSTEM IMPL TI UHMWPE KNOTLESS FOR SYNDESMOSIS FIX  [employer] [facility] [id number] Left 1 Implanted       INJECTABLES: none    SPECIMEN: none  * No specimens in log *    COMPLICATIONS: none    FINDINGS: ankle ORIF performed with syndesmotic repair and scope

## 2024-05-03 NOTE — ANESTHESIA PROCEDURE NOTES
Peripheral Block    Patient location during procedure: pre-op  Reason for block: procedure for pain, post-op pain management and at surgeon's request  Start time: 5/3/2024 2:27 PM  End time: 5/3/2024 2:42 PM  Staffing  Performed: anesthesiologist   Anesthesiologist: Albert Durbin MD  Performed by: Albert Durbin MD  Authorized by: Albert Durbin MD    Preanesthetic Checklist  Completed: patient identified, IV checked, site marked, risks and benefits discussed, surgical/procedural consents, equipment checked, pre-op evaluation, timeout performed, anesthesia consent given, oxygen available, monitors applied/VS acknowledged, fire risk safety assessment completed and verbalized and blood product R/B/A discussed and consented  Peripheral Block   Patient position: supine  Prep: ChloraPrep  Provider prep: mask and sterile gloves  Patient monitoring: cardiac monitor, continuous pulse ox, continuous capnometry, frequent blood pressure checks, IV access, oxygen and responsive to questions  Block type: Femoral and Sciatic  Laterality: left  Injection technique: single-shot  Guidance: ultrasound guided  Local infiltration: lidocaine (20mg decadron)  Infiltration strength: 1 %  Local infiltration: lidocaine (20mg decadron)  Dose: 3 mL    Needle   Needle type: pencil-tip   Needle gauge: 20 G  Needle localization: ultrasound guidance  Assessment   Injection assessment: negative aspiration for heme, no paresthesia on injection, local visualized surrounding nerve on ultrasound and no intravascular symptoms  Paresthesia pain: none  Slow fractionated injection: yes  Hemodynamics: stable  Outcomes: patient tolerated procedure well and uncomplicated    Additional Notes  Preprocedure ready time 1427  Medications Administered  BUPivacaine (MARCAINE) PF injection 0.5% - Perineural   35 mL - 5/3/2024 2:27:00 PM

## 2024-05-03 NOTE — DISCHARGE INSTRUCTIONS
Podiatric Post Operative Instructions:  You have had a surgical procedure on your left foot.      Fluids and Diet:  Begin with clear liquids, broth, dry toast, and crackers.  If not nauseated then resume your regular pre-operative diet when you are ready    Medications:  Take your prescriptions as directed  You are receiving new prescriptions for pain  You received popliteal block (nerve block in the back of knee)- this should manage your pain for the first 18hrs post operatively  If your pain is not severe then you may take the non-prescription medication that you normally take for aches and pains ie Tylenol or Ibuprofen. If you are taking ibuprofen please take a day after you finish your ketorolac (Toradol). Do not take them at the same time  You may resume your regularly scheduled medications (unless otherwise directed)  If any side effects or adverse reactions occur, discontinue the medication and contact your doctor.  Review the patient drug information that is provided before you take any medication    Ambulation and Activity:  You are advised to go directly home from the hospital  Use crutches when ambulating  We recommend knee scooter if possible, you can also obtain these on [employer] for rather affordable and quickly obtainable.  You may not put weight on the operated foot.  You should dressing dry and on at all times when awake. Do not walk on the left foot.  Avoid stairs.  Do not lift or move heavy objects  Do not drive until cleared by your physician    Bandage and Wound Care Instructions:  Keep bandage clean and dry  Do NOT remove dressing/ splint  DO NOT get wet  Please use shower cover around leg if you do shower so that dressing does not get wet  Do not shower or bathe the operative extremity  Do not remove the bandage (unless otherwise directed)   Do not attempt to put anything between the cast or dressing and your skin, some itching is normal.    Ice and Elevation:  Elevate operative extremity

## 2024-05-04 NOTE — OP NOTE
Operative Note    PATIENT NAME: Justin Smart  YOB: 1995  -  28 y.o. male  MRN: [id number]  DATE: 5/3/2024  BILLING #: [id number]     Surgeon(s):  Alvin Ireland DPM      ASSISTANTS: Sylvain Morales DPM PGY3; Neha Brizuela PGY3     PRE-OP DIAGNOSIS:   Closed trimalleolar fracture; left ankle  Left ankle pain     POST-OP DIAGNOSIS: Same as above.     PROCEDURE:   Open reduction internal fixation of left ankle  Syndesmotic repair; left ankle  Arthroscopy of left ankle     ANESTHESIA: General     HEMOSTASIS: Pneumatic tourniquet to left thigh @ 300 mmHg for 120 minutes.     ESTIMATED BLOOD LOSS: Less than 25cc.     MATERIALS:   Implant Name Type Inv. Item Serial No.  Lot No. LRB No. Used Action   PLATE BONE 5 H LT DSTL FIB TI LCK - [id number]   PLATE BONE 5 H LT DSTL FIB TI LCK   [employer]   Left 1 Implanted   SCREW BNE L14MM DIA3.5MM DARLEEN TI ST NONLOCKING HD LO PROF - [id number]   SCREW BNE L14MM DIA3.5MM DARLEEN TI ST NONLOCKING HD LO PROF   [employer]   Left 2 Implanted   SCREW BNE L16MM DIA3.5MM ANK TI LO PROF - [id number]   SCREW BNE L16MM DIA3.5MM ANK TI LO PROF   [employer]   Left 1 Implanted   SCREW BNE L30MM DIA4MM TI ANCELMO PARTIALLY THRD LO PROF - [id number]   SCREW BNE L30MM DIA4MM TI ANCELMO PARTIALLY THRD LO PROF   [employer]   Left 1 Implanted   ANCHOR FIX DISP FOR ANK FRAC SYS BB-JONELLE - [id number]   ANCHOR FIX DISP FOR ANK FRAC SYS BB-JONELLE   [employer]   Left 2 Implanted   SCREW RANDY KREULOCK T1 3.0 X 14 - [id number]   SCREW RANDY KREULOCK T1 3.0 X 14   [employer]   Left 4 Implanted   COMPR FT SCRW 3.5 MINI 54MM - [id number]   COMPR FT SCRW 3.5 MINI 54MM   [employer]   Left 2 Implanted   SYSTEM IMPL TI UHMWPE KNOTLESS FOR SYNDESMOSIS FIX - [id number]   SYSTEM IMPL TI UHMWPE KNOTLESS FOR SYNDESMOSIS FIX   [employer] [id number] Left 1 Implanted         INJECTABLES: none     SPECIMEN: none  * No specimens in log *     COMPLICATIONS: none     FINDINGS: ankle  aseptic fashion.  A timeout was performed confirming the correct patient, correct site, correct procedure, preoperative antibiotics.  Everyone in the room was in agreement.  The left lower extremity was exsanguinated using Esmarch bandage and the pneumatic thigh tourniquet was inflated to 300 mmHg and would remain inflated for 120 minutes throughout the procedure.    Open reduction internal fixation of left ankle:    Attention was directed to the posterior lateral aspect of the left ankle where there was noted to be a palpable prominence correlating with the proximal end of the distal fibular fracture.  We then completed a posterior lateral approach to the ankle.  A longitudinal incision was made along the mid substance of the fibula medial to the peroneal tendons utilizing #15 blade.  Incision was deepened to the level of periosteum with care taken to protect and retract all neurovascular structures.  The peroneal tendons were identified and retracted posteriorly. This provided excellent visualization of the fracture site a displaced fracture was noted at this time. The periosteum immediately surrounding the fracture borders were dissected to allow for full visualization of the fracture, care was taken to minimize periosteal dissection. The fracture bed was cleared of all hematoma and debris.      Then a lobster claw reduction clamp was utilized to directly manipulate the fracture until it was anatomically reduced and compressed. This was confirmed with direct visualization of the fracture as well as under fluoroscopic imaging.  We then placed a 3.5-mm screw in a lag by technique fashion to further compress and stabilize the fracture site.  There was excellent purchase as well as compression across the fracture site.  The lobster-claw reduction clamp was removed and maintenance of fracture reduction was noted.  We then move forward with our fixation with use of an anatomic plate which was appropriately contoured to

## 2024-05-06 NOTE — ANESTHESIA POSTPROCEDURE EVALUATION
Department of Anesthesiology  Postprocedure Note    Patient: Justin Smart  MRN: [id number]  YOB: 1995  Date of evaluation: 5/6/2024    Procedure Summary       Date: 05/03/24 Room / Location: [address]    Anesthesia Start: 1553 Anesthesia Stop: 1854    Procedures:       LEFT ANKLE  ORIF, LEFT ANKLE SYNDESMOSIS ORIF (Left: Ankle)      LEFT ANKLE ARTHROSCOPY WITH EXTENSIVE DEBRIDEMENT (Left: Ankle) Diagnosis:       Closed trimalleolar fracture of left ankle, initial encounter      (Closed trimalleolar fracture of left ankle, initial encounter [S82.852A])    Surgeons: Alvin Ireland DPM Responsible Provider: Ramandeep Rodriguez MD    Anesthesia Type: general, regional ASA Status: 2            Anesthesia Type: No value filed.    Ghassan Phase I: Ghassan Score: 10    Ghassan Phase II: Ghassan Score: 10    Anesthesia Post Evaluation    Patient location during evaluation: PACU  Patient participation: complete - patient participated  Level of consciousness: awake  Airway patency: patent  Nausea & Vomiting: no nausea  Cardiovascular status: blood pressure returned to baseline  Respiratory status: acceptable  Hydration status: euvolemic  Comments: Multimodal analgesia pain management as indicated by procedure  Multimodal analgesia pain management approach  Pain management: adequate    No notable events documented.

## 2024-05-07 PROBLEM — S82.852A CLOSED TRIMALLEOLAR FRACTURE OF LEFT ANKLE: Status: ACTIVE | Noted: 2024-05-07

## 2024-05-07 PROBLEM — M25.572 ACUTE LEFT ANKLE PAIN: Status: ACTIVE | Noted: 2024-05-07

## 2024-05-07 PROBLEM — Z98.890 POST-OPERATIVE STATE: Status: ACTIVE | Noted: 2024-05-07

## 2024-05-07 PROBLEM — M65.9 SYNOVITIS OF LEFT ANKLE: Status: ACTIVE | Noted: 2024-05-07

## 2024-05-07 PROBLEM — S93.05XA ANKLE DISLOCATION, LEFT, INITIAL ENCOUNTER: Status: ACTIVE | Noted: 2024-05-07

## 2024-05-07 PROBLEM — M65.972 SYNOVITIS OF LEFT ANKLE: Status: ACTIVE | Noted: 2024-05-07

## 2024-05-09 ENCOUNTER — OFFICE VISIT (OUTPATIENT)
Dept: PODIATRY | Age: 29
End: 2024-05-09

## 2024-05-09 VITALS — WEIGHT: 164 LBS | BODY MASS INDEX: 22.96 KG/M2 | HEIGHT: 71 IN

## 2024-05-09 DIAGNOSIS — Z98.890 POST-OPERATIVE STATE: ICD-10-CM

## 2024-05-09 DIAGNOSIS — S93.05XA ANKLE DISLOCATION, LEFT, INITIAL ENCOUNTER: Primary | ICD-10-CM

## 2024-05-09 PROCEDURE — 99024 POSTOP FOLLOW-UP VISIT: CPT | Performed by: PODIATRIST

## 2024-05-09 NOTE — PROGRESS NOTES
[facility] PODIATRY [address]  [address]  [place] [id number]  Dept: [phone]  Dept Fax: [phone]    POST-OP PROGRESS NOTE  Date of patient's visit: 5/9/2024  Patient's Name:  Justin Smart YOB: 1995            No care team member to display        Chief Complaint   Patient presents with    Post-Op Check     1 wk post op LEFT ANKLE  ORIF, LEFT ANKLE SYNDESMOSIS ORIF       Subjective: Justin Smart is a 28 y.o. male who presents to the office today 1week(s)  S/P as above for correction of LEFT ANKLE  ORIF, LEFT ANKLE SYNDESMOSIS ORIF.  Problem List Items Addressed This Visit       Post-operative state    Relevant Orders    Scooter    Ankle dislocation, left, initial encounter - Primary    Relevant Orders    Scooter   . Patient relates pain is Present and STAYED THE SAME.  Pain is rated 3 out of 10 and is described as constant, mild. Currently denies F/C/N/V.  Is patient taking pain medications as prescribed and is controlling pain    Physical Examination:  Incision is coapted, sutures/steri-strips are intact. Minimal bleeding post operatively. Edema present. No erythema. No Pus.   Operative correction is satisfactory.      Radiographs: none today       Assessment: Justin Smart is status post as above  Normal post operative course.  Doing well          ICD-10-CM    1. Ankle dislocation, left, initial encounter  S93.05XA Scooter      2. Post-operative state  Z98.890 Scooter            Plan:  Patient examined and evaluated.  Current condition and treatment options discussed in detail.  Advised pt to his conditon    New dsd applied.  Pt to remain nonweightbaering  Rx for knee scooter  Sutures out next week and will transition into a cam boot .  Verbal and written instructions given to patient.  Orders:   Orders Placed This Encounter   Procedures    Scooter     Knee scooter s/p ankle surgery post op      Contact office

## 2024-05-21 ENCOUNTER — OFFICE VISIT (OUTPATIENT)
Dept: PODIATRY | Age: 29
End: 2024-05-21

## 2024-05-21 VITALS — HEIGHT: 71 IN | WEIGHT: 164 LBS | BODY MASS INDEX: 22.96 KG/M2

## 2024-05-21 DIAGNOSIS — S93.05XA ANKLE DISLOCATION, LEFT, INITIAL ENCOUNTER: ICD-10-CM

## 2024-05-21 DIAGNOSIS — Z98.890 POST-OPERATIVE STATE: Primary | ICD-10-CM

## 2024-05-21 PROCEDURE — 99024 POSTOP FOLLOW-UP VISIT: CPT | Performed by: PODIATRIST

## 2024-05-21 NOTE — PROGRESS NOTES
[facility] PODIATRY [address]  [address]  [place] [id number]  Dept: [phone]  Dept Fax: [phone]    POST-OP PROGRESS NOTE  Date of patient's visit: 5/21/2024  Patient's Name:  Justin Smart YOB: 1995            Patient Care Team:  Montana Tam DO as PCP - General  Alvin Ireland DPM as Physician (PODIATRIST FOOT AND ANKLE SURGERY)        Chief Complaint   Patient presents with    Post-Op Check     3 wk post op LEFT ANKLE  ORIF, LEFT ANKLE SYNDESMOSIS ORIF         Subjective: Justin Smart is a 28 y.o. male who presents to the office today 3week(s)  S/P left ankle ORIF for correction of ankle disclocation   Problem List Items Addressed This Visit    None  . Patient relates pain is Present and improved.  Pain is rated 1 out of 10 and is described as intermittent, mild. Currently denies F/C/N/V.  Is patient taking pain medications as prescribed and is controlling pain    Physical Examination:  Incision is coapted, sutures/steri-strips are intact. Minimal bleeding post operatively. Edema present. No erythema. No Pus.   Operative correction is satisfactory.      Radiographs: none today       Assessment: Justin Smart is status post left ankle ORIF   Normal post operative course.  Doing well     Diagnosis Orders   1. Post-operative state        2. Ankle dislocation, left, initial encounter                Plan:  Patient examined and evaluated.  Current condition and treatment options discussed in detail.  Advised pt to left ankle    Patient presents for suture removal. The wound is well healed without signs of infection.  The sutures are removed and the steri strips applied followed by DSD consisting of 4x4, Kerlix and Ace Wrap.  . Wound care and activity instructions given. .    .  Verbal and written instructions given to patient.  Orders: No orders of the defined types were placed in this encounter.     Contact

## 2024-06-04 ENCOUNTER — OFFICE VISIT (OUTPATIENT)
Dept: PODIATRY | Age: 29
End: 2024-06-04

## 2024-06-04 VITALS — WEIGHT: 164 LBS | HEIGHT: 71 IN | BODY MASS INDEX: 22.96 KG/M2

## 2024-06-04 DIAGNOSIS — Z98.890 POST-OPERATIVE STATE: Primary | ICD-10-CM

## 2024-06-04 PROCEDURE — 99024 POSTOP FOLLOW-UP VISIT: CPT | Performed by: PODIATRIST

## 2024-06-04 NOTE — PROGRESS NOTES
[facility] PODIATRY [address]  [address]  [place] [id number]  Dept: [phone]  Dept Fax: [phone]    POST-OP PROGRESS NOTE  Date of patient's visit: 6/4/2024  Patient's Name:  Justin Smart YOB: 1995            Patient Care Team:  Montana Tam DO as PCP - General  Alvin Ireland DPM as Physician (PODIATRIST FOOT AND ANKLE SURGERY)        Chief Complaint   Patient presents with    Post-Op Check     5 wk post op LEFT ANKLE  ORIF, LEFT ANKLE SYNDESMOSIS ORIF    X-ray      Left ankle         Subjective: Justin Smart is a 28 y.o. male who presents to the office today 5week(s)  S/P 5 weeks left ankle ORIF for correction of   Problem List Items Addressed This Visit       Post-operative state - Primary    Relevant Orders    XR ANKLE LEFT (MIN 3 VIEWS) (Completed)   . Patient relates pain is Present and improved.  Pain is rated 2 out of 10 and is described as constant, mild. Currently denies F/C/N/V.  Is patient taking pain medications as prescribed and is controlling pain   Pt is coming out of the boot to move around anid is doing well     Physical Examination:  Incision is coapted, sutures/steri-strips are intact. Minimal bleeding post operatively. Edema present. No erythema. No Pus.   Operative correction is satisfactory.      Radiographs: xrays left ankle 3 views Intact hardware to the left ankle mortise with anatomical alignment of all fractured fragments. No displacement from previous x-rays.      Assessment: Justin Smart is status post as abive  Normal post operative course.  Doing well          ICD-10-CM    1. Post-operative state  Z98.890 XR ANKLE LEFT (MIN 3 VIEWS)            Plan:  Patient examined and evaluated.  Current condition and treatment options discussed in detail.  Advised pt to the left foot x rays 3 views.  .  Verbal and written instructions given to patient.  Orders:     Pt is able to start

## 2024-06-06 PROBLEM — Z98.890 POST-OPERATIVE STATE: Status: RESOLVED | Noted: 2024-05-07 | Resolved: 2024-06-06

## 2024-06-18 ENCOUNTER — OFFICE VISIT (OUTPATIENT)
Dept: PODIATRY | Age: 29
End: 2024-06-18

## 2024-06-18 DIAGNOSIS — Z98.890 POST-OPERATIVE STATE: Primary | ICD-10-CM

## 2024-06-18 PROCEDURE — 99024 POSTOP FOLLOW-UP VISIT: CPT | Performed by: PODIATRIST

## 2024-07-18 ENCOUNTER — OFFICE VISIT (OUTPATIENT)
Dept: PODIATRY | Age: 29
End: 2024-07-18

## 2024-07-18 VITALS — HEIGHT: 71 IN | WEIGHT: 164 LBS | BODY MASS INDEX: 22.96 KG/M2

## 2024-07-18 DIAGNOSIS — Z98.890 POST-OPERATIVE STATE: Primary | ICD-10-CM

## 2024-07-18 PROCEDURE — 99024 POSTOP FOLLOW-UP VISIT: CPT | Performed by: PODIATRIST

## 2024-07-18 RX ORDER — ALBUTEROL SULFATE 90 UG/1
AEROSOL, METERED RESPIRATORY (INHALATION)
COMMUNITY
Start: 2019-11-29

## 2024-07-25 NOTE — PROGRESS NOTES
speed of this exercise as your healing progresses:   Place a rolled towel or short object on the ground to the side of your injured foot.  Hop over the towel and land on the injured foot.  Then hop back over the towel and land on the uninjured foot.  .  Verbal and written instructions given to patient.  Orders:   Orders Placed This Encounter   Procedures    XR ANKLE LEFT (MIN 3 VIEWS)     Order Specific Question:   Reason for exam:     Answer:   post op      Contact office with any questions/problems/concerns.  RTC in 4week(s)  No restrictions   .     Electronically signed by Alvin Ireland DPM on 7/25/2024 at 7:09 AM  7/18/2024

## 2024-09-10 ENCOUNTER — OFFICE VISIT (OUTPATIENT)
Dept: PODIATRY | Age: 29
End: 2024-09-10
Payer: COMMERCIAL

## 2024-09-10 VITALS — BODY MASS INDEX: 22.96 KG/M2 | WEIGHT: 164 LBS | HEIGHT: 71 IN

## 2024-09-10 DIAGNOSIS — M25.472 EDEMA OF LEFT ANKLE: Primary | ICD-10-CM

## 2024-09-10 PROCEDURE — 99214 OFFICE O/P EST MOD 30 MIN: CPT | Performed by: PODIATRIST

## 2024-09-19 ENCOUNTER — OFFICE VISIT (OUTPATIENT)
Dept: PODIATRY | Age: 29
End: 2024-09-19
Payer: COMMERCIAL

## 2024-09-19 VITALS — HEIGHT: 71 IN | BODY MASS INDEX: 22.96 KG/M2 | WEIGHT: 164 LBS

## 2024-09-19 DIAGNOSIS — M25.571 ACUTE RIGHT ANKLE PAIN: Primary | ICD-10-CM

## 2024-09-19 PROCEDURE — 99214 OFFICE O/P EST MOD 30 MIN: CPT | Performed by: PODIATRIST

## 2024-09-26 ENCOUNTER — TELEPHONE (OUTPATIENT)
Dept: PODIATRY | Age: 29
End: 2024-09-26

## 2024-10-07 ENCOUNTER — HOSPITAL ENCOUNTER (OUTPATIENT)
Dept: GENERAL RADIOLOGY | Age: 29
Discharge: HOME | End: 2024-10-09
Payer: COMMERCIAL

## 2024-10-07 ENCOUNTER — HOSPITAL ENCOUNTER (OUTPATIENT)
Dept: MRI IMAGING | Age: 29
Discharge: HOME | End: 2024-10-09
Payer: COMMERCIAL

## 2024-10-07 DIAGNOSIS — T15.91XA FOREIGN BODY OF RIGHT EYE, INITIAL ENCOUNTER: ICD-10-CM

## 2024-10-07 DIAGNOSIS — M54.17 LUMBOSACRAL RADICULOPATHY: ICD-10-CM

## 2024-10-07 PROCEDURE — 70030 X-RAY EYE FOR FOREIGN BODY: CPT

## 2024-10-07 PROCEDURE — 72148 MRI LUMBAR SPINE W/O DYE: CPT

## 2024-10-09 ENCOUNTER — OFFICE VISIT (OUTPATIENT)
Dept: PODIATRY | Age: 29
End: 2024-10-09
Payer: COMMERCIAL

## 2024-10-09 VITALS — HEIGHT: 71 IN | WEIGHT: 164 LBS | BODY MASS INDEX: 22.96 KG/M2

## 2024-10-09 DIAGNOSIS — S93.491D SPRAIN OF ANTERIOR TALOFIBULAR LIGAMENT OF RIGHT ANKLE, SUBSEQUENT ENCOUNTER: Primary | ICD-10-CM

## 2024-10-09 DIAGNOSIS — M25.571 ACUTE RIGHT ANKLE PAIN: ICD-10-CM

## 2024-10-09 DIAGNOSIS — S86.311D PERONEAL TENDON TEAR, RIGHT, SUBSEQUENT ENCOUNTER: ICD-10-CM

## 2024-10-09 PROCEDURE — 99214 OFFICE O/P EST MOD 30 MIN: CPT | Performed by: PODIATRIST

## 2024-10-09 NOTE — PROGRESS NOTES
[facility] PHYSICIANS [facility] PODIATRY  [address] [place] [id number]  Dept: [phone]    RETURN PATIENT PROGRESS NOTE  Date of patient's visit: 10/9/2024  Patient's Name:  Justin Smart YOB: 1995            Patient Care Team:  Migeu Corado MD as PCP - General (Family Medicine)  Alvin Ireland DPM as Physician (Podiatry, Foot & Ankle Surgery)       Justin Smart 28 y.o. male that presents for follow-up of   Chief Complaint   Patient presents with    Foot Pain     Right foot x 1 month     Pt's primary care physician is Migue Corado MD last seen has new patient appointment scheduled for January 20 2025.  Symptoms began 1 month(s) ago and are decreased .  Patient relates pain is Present.  Pain is rated 3 out of 10 and is described as intermittent.  Treatments prior to today's visit include: previous podiatry treatment.  Currently denies F/C/N/V.     No Known Allergies    Past Medical History:   Diagnosis Date    Anxiety     Asthma     Depression        Prior to Admission medications    Medication Sig Start Date End Date Taking? Authorizing Provider   albuterol sulfate HFA (PROVENTIL;VENTOLIN;PROAIR) 108 (90 Base) MCG/ACT inhaler Inhale into the lungs 11/29/19  Yes ProviderJose MD   sertraline (ZOLOFT) 100 MG tablet Take 2 tablets by mouth daily   Yes Provider, MD Jose   ibuprofen (ADVIL;MOTRIN) 800 MG tablet Take 1 tablet by mouth every 8 hours as needed for Pain 5/7/24 6/6/24  Sylvain Morales DPM   ketorolac (TORADOL) 10 MG tablet Take 1 tablet by mouth every 6 hours as needed for Pain (Take no more than 5 days) 5/1/24 5/6/24  Adrianna Tejada DPM   acetaminophen (TYLENOL) 500 MG tablet Take 2 tablets by mouth 3 times daily 5/1/24   Adrianna Tejada DPM       Review of Systems    Review of Systems:  History obtained from chart review and the patient  General ROS: negative for - chills, fatigue,

## 2024-10-22 ENCOUNTER — HOSPITAL ENCOUNTER (OUTPATIENT)
Dept: MRI IMAGING | Age: 29
Discharge: HOME | End: 2024-10-24
Attending: PODIATRIST
Payer: COMMERCIAL

## 2024-10-22 DIAGNOSIS — S93.491D SPRAIN OF ANTERIOR TALOFIBULAR LIGAMENT OF RIGHT ANKLE, SUBSEQUENT ENCOUNTER: ICD-10-CM

## 2024-10-22 DIAGNOSIS — M25.571 ACUTE RIGHT ANKLE PAIN: ICD-10-CM

## 2024-10-22 PROCEDURE — 73721 MRI JNT OF LWR EXTRE W/O DYE: CPT

## 2024-10-29 ENCOUNTER — OFFICE VISIT (OUTPATIENT)
Dept: PODIATRY | Age: 29
End: 2024-10-29
Payer: COMMERCIAL

## 2024-10-29 VITALS — WEIGHT: 164 LBS | BODY MASS INDEX: 22.96 KG/M2 | HEIGHT: 71 IN

## 2024-10-29 DIAGNOSIS — M25.571 ACUTE RIGHT ANKLE PAIN: ICD-10-CM

## 2024-10-29 DIAGNOSIS — S93.491D SPRAIN OF ANTERIOR TALOFIBULAR LIGAMENT OF RIGHT ANKLE, SUBSEQUENT ENCOUNTER: Primary | ICD-10-CM

## 2024-10-29 PROCEDURE — 99213 OFFICE O/P EST LOW 20 MIN: CPT | Performed by: PODIATRIST

## 2024-10-29 RX ORDER — CYCLOBENZAPRINE HCL 10 MG
10 TABLET ORAL NIGHTLY
COMMUNITY
Start: 2024-10-08

## 2024-10-29 RX ORDER — KETOROLAC TROMETHAMINE 10 MG/1
10 TABLET, FILM COATED ORAL EVERY 6 HOURS PRN
Qty: 20 TABLET | Refills: 0 | Status: SHIPPED | OUTPATIENT
Start: 2024-10-29 | End: 2025-10-29

## 2024-10-29 NOTE — PROGRESS NOTES
hours as needed for Pain 5/7/24 6/6/24  Sylvain Morales DPM   ketorolac (TORADOL) 10 MG tablet Take 1 tablet by mouth every 6 hours as needed for Pain (Take no more than 5 days) 5/1/24 5/6/24  Adrianna Tejada DPM       Review of Systems    Review of Systems:  History obtained from chart review and the patient  General ROS: negative for - chills, fatigue, fever, night sweats or weight gain  Constitutional: Negative for chills, diaphoresis, fatigue, fever and unexpected weight change.  Musculoskeletal: Positive for arthralgias, gait problem and joint swelling.  Neurological ROS: negative for - behavioral changes, confusion, headaches or seizures. Negative for weakness and numbness.   Dermatological ROS: negative for - mole changes, rash  Cardiovascular: Negative for leg swelling.   Gastrointestinal: Negative for constipation, diarrhea, nausea and vomiting.         Lower Extremity Physical Examination:     Vitals: There were no vitals filed for this visit.  General: AAO x 3 in NAD.   Dermatologic Exam:  Skin lesion/ulceration Absent .   Skin No rashes or nodules noted..       Musculoskeletal:     1st MPJ ROM decreased, Bilateral.  Muscle strength 5/5, Bilateral.  Pain present upon palpation of anterior lateral ankle and the posterior lateral ankle along the peroneal tendons with ecchymosis and edema noted.  Medial longitudinal arch, Bilateral WNL.  Ankle ROM WNL,Bilateral.    Dorsally contracted digits absent digits 1-5 Bilateral.     Vascular: DP and PT pulses palpable 2/4, Bilateral.  CFT <3 seconds, Bilateral.  Hair growth present to the level of the digits, Bilateral.  Edema absent, Bilateral.  Varicosities absent, Bilateral. Erythema absent, Bilateral    Neurological: Sensation intact to light touch to level of digits, Bilateral.  Protective sensation intact 10/10 sites via 5.07/10g [place] Monofilament, Bilateral.  negative Tinel's, Bilateral.  negative Valleix sign, Bilateral.      Integument: Warm,

## 2024-11-01 ENCOUNTER — TELEPHONE (OUTPATIENT)
Dept: PODIATRY | Age: 29
End: 2024-11-01

## 2024-11-01 NOTE — TELEPHONE ENCOUNTER
Patient stated he was just in the office and was told he can receive an injection. He is requesting a call regarding this and can be reached at [phone]. Okay to leave a message.

## 2024-11-19 ENCOUNTER — OFFICE VISIT (OUTPATIENT)
Dept: PODIATRY | Age: 29
End: 2024-11-19
Payer: COMMERCIAL

## 2024-11-19 VITALS — BODY MASS INDEX: 22.96 KG/M2 | HEIGHT: 71 IN | WEIGHT: 164 LBS

## 2024-11-19 DIAGNOSIS — S93.05XA ANKLE DISLOCATION, LEFT, INITIAL ENCOUNTER: ICD-10-CM

## 2024-11-19 DIAGNOSIS — S86.311D PERONEAL TENDON TEAR, RIGHT, SUBSEQUENT ENCOUNTER: ICD-10-CM

## 2024-11-19 DIAGNOSIS — Z98.890 POST-OPERATIVE STATE: ICD-10-CM

## 2024-11-19 DIAGNOSIS — S93.491D SPRAIN OF ANTERIOR TALOFIBULAR LIGAMENT OF RIGHT ANKLE, SUBSEQUENT ENCOUNTER: Primary | ICD-10-CM

## 2024-11-19 DIAGNOSIS — M25.472 EDEMA OF LEFT ANKLE: ICD-10-CM

## 2024-11-19 DIAGNOSIS — M25.571 ACUTE RIGHT ANKLE PAIN: ICD-10-CM

## 2024-11-19 PROCEDURE — 99214 OFFICE O/P EST MOD 30 MIN: CPT | Performed by: PODIATRIST

## 2024-11-19 NOTE — PROGRESS NOTES
there is no reason to proceed with surgical intervention.    Pt does elect to have the procedure above    A total of 35 minutes was spent with this patients encounter    No orders of the defined types were placed in this encounter.    No orders of the defined types were placed in this encounter.       RTC in 1week(s) post op .    11/19/2024      Electronically signed by Alvin Ireland DPM on 11/19/2024 at 10:19 AM  11/19/2024

## 2024-11-25 ENCOUNTER — TELEPHONE (OUTPATIENT)
Dept: PODIATRY | Age: 29
End: 2024-11-25

## 2024-11-25 NOTE — TELEPHONE ENCOUNTER
Spoke to patient to confirm his sx: at [facility] on 1/17/2025 at 7:30 am  and to arrive at 6:00 am, phone call P.JANA on 1/6/2025 at 12:00 pm. Mailed to patient outpatient surgery pamphlet

## 2025-01-06 ENCOUNTER — HOSPITAL ENCOUNTER (OUTPATIENT)
Age: 30
Discharge: HOME | End: 2025-01-10

## 2025-01-07 VITALS — HEIGHT: 71 IN | WEIGHT: 165 LBS | BODY MASS INDEX: 23.1 KG/M2

## 2025-01-17 ENCOUNTER — ANESTHESIA (OUTPATIENT)
Dept: OPERATING ROOM | Age: 30
End: 2025-01-17
Payer: COMMERCIAL

## 2025-01-17 ENCOUNTER — HOSPITAL ENCOUNTER (OUTPATIENT)
Age: 30
Setting detail: OUTPATIENT SURGERY
Discharge: HOME | End: 2025-01-17
Attending: PODIATRIST | Admitting: PODIATRIST
Payer: COMMERCIAL

## 2025-01-17 ENCOUNTER — ANESTHESIA EVENT (OUTPATIENT)
Dept: OPERATING ROOM | Age: 30
End: 2025-01-17
Payer: COMMERCIAL

## 2025-01-17 ENCOUNTER — APPOINTMENT (OUTPATIENT)
Dept: GENERAL RADIOLOGY | Age: 30
End: 2025-01-17
Attending: PODIATRIST
Payer: COMMERCIAL

## 2025-01-17 VITALS
BODY MASS INDEX: 23.1 KG/M2 | RESPIRATION RATE: 15 BRPM | DIASTOLIC BLOOD PRESSURE: 91 MMHG | HEIGHT: 71 IN | HEART RATE: 69 BPM | TEMPERATURE: 98.1 F | WEIGHT: 165 LBS | SYSTOLIC BLOOD PRESSURE: 103 MMHG | OXYGEN SATURATION: 100 %

## 2025-01-17 DIAGNOSIS — G89.18 POST-OP PAIN: Primary | ICD-10-CM

## 2025-01-17 PROCEDURE — 6360000002 HC RX W HCPCS: Performed by: PODIATRIST

## 2025-01-17 PROCEDURE — 6360000002 HC RX W HCPCS: Performed by: NURSE ANESTHETIST, CERTIFIED REGISTERED

## 2025-01-17 PROCEDURE — 7100000010 HC PHASE II RECOVERY - FIRST 15 MIN: Performed by: PODIATRIST

## 2025-01-17 PROCEDURE — 3600000012 HC SURGERY LEVEL 2 ADDTL 15MIN: Performed by: PODIATRIST

## 2025-01-17 PROCEDURE — 7100000011 HC PHASE II RECOVERY - ADDTL 15 MIN: Performed by: PODIATRIST

## 2025-01-17 PROCEDURE — 7100000000 HC PACU RECOVERY - FIRST 15 MIN: Performed by: PODIATRIST

## 2025-01-17 PROCEDURE — 14040 TIS TRNFR F/C/C/M/N/A/G/H/F: CPT | Performed by: PODIATRIST

## 2025-01-17 PROCEDURE — 3600000002 HC SURGERY LEVEL 2 BASE: Performed by: PODIATRIST

## 2025-01-17 PROCEDURE — 27680 RELEASE OF LOWER LEG TENDON: CPT | Performed by: PODIATRIST

## 2025-01-17 PROCEDURE — 2580000003 HC RX 258: Performed by: ANESTHESIOLOGY

## 2025-01-17 PROCEDURE — 7100000001 HC PACU RECOVERY - ADDTL 15 MIN: Performed by: PODIATRIST

## 2025-01-17 PROCEDURE — 3700000001 HC ADD 15 MINUTES (ANESTHESIA): Performed by: PODIATRIST

## 2025-01-17 PROCEDURE — 3700000000 HC ANESTHESIA ATTENDED CARE: Performed by: PODIATRIST

## 2025-01-17 PROCEDURE — 20680 REMOVAL OF IMPLANT DEEP: CPT | Performed by: PODIATRIST

## 2025-01-17 PROCEDURE — 2709999900 HC NON-CHARGEABLE SUPPLY: Performed by: PODIATRIST

## 2025-01-17 RX ORDER — SODIUM CHLORIDE 0.9 % (FLUSH) 0.9 %
5-40 SYRINGE (ML) INJECTION EVERY 12 HOURS SCHEDULED
Status: DISCONTINUED | OUTPATIENT
Start: 2025-01-17 | End: 2025-01-17 | Stop reason: HOSPADM

## 2025-01-17 RX ORDER — ONDANSETRON 2 MG/ML
4 INJECTION INTRAMUSCULAR; INTRAVENOUS
Status: DISCONTINUED | OUTPATIENT
Start: 2025-01-17 | End: 2025-01-17 | Stop reason: HOSPADM

## 2025-01-17 RX ORDER — SODIUM CHLORIDE 0.9 % (FLUSH) 0.9 %
5-40 SYRINGE (ML) INJECTION PRN
Status: DISCONTINUED | OUTPATIENT
Start: 2025-01-17 | End: 2025-01-17 | Stop reason: HOSPADM

## 2025-01-17 RX ORDER — FENTANYL CITRATE 50 UG/ML
25 INJECTION, SOLUTION INTRAMUSCULAR; INTRAVENOUS EVERY 5 MIN PRN
Status: DISCONTINUED | OUTPATIENT
Start: 2025-01-17 | End: 2025-01-17 | Stop reason: HOSPADM

## 2025-01-17 RX ORDER — KETOROLAC TROMETHAMINE 30 MG/ML
INJECTION, SOLUTION INTRAMUSCULAR; INTRAVENOUS
Status: DISCONTINUED | OUTPATIENT
Start: 2025-01-17 | End: 2025-01-17 | Stop reason: SDUPTHER

## 2025-01-17 RX ORDER — DEXAMETHASONE SODIUM PHOSPHATE 10 MG/ML
INJECTION, SOLUTION INTRAMUSCULAR; INTRAVENOUS
Status: DISCONTINUED | OUTPATIENT
Start: 2025-01-17 | End: 2025-01-17 | Stop reason: SDUPTHER

## 2025-01-17 RX ORDER — LIDOCAINE HYDROCHLORIDE 20 MG/ML
INJECTION, SOLUTION EPIDURAL; INFILTRATION; INTRACAUDAL; PERINEURAL
Status: DISCONTINUED | OUTPATIENT
Start: 2025-01-17 | End: 2025-01-17 | Stop reason: SDUPTHER

## 2025-01-17 RX ORDER — SODIUM CHLORIDE, SODIUM LACTATE, POTASSIUM CHLORIDE, CALCIUM CHLORIDE 600; 310; 30; 20 MG/100ML; MG/100ML; MG/100ML; MG/100ML
INJECTION, SOLUTION INTRAVENOUS CONTINUOUS
Status: DISCONTINUED | OUTPATIENT
Start: 2025-01-17 | End: 2025-01-17 | Stop reason: HOSPADM

## 2025-01-17 RX ORDER — CEFAZOLIN SODIUM/WATER 2 G/20 ML
2000 SYRINGE (ML) INTRAVENOUS ONCE
Status: COMPLETED | OUTPATIENT
Start: 2025-01-17 | End: 2025-01-17

## 2025-01-17 RX ORDER — FENTANYL CITRATE 50 UG/ML
50 INJECTION, SOLUTION INTRAMUSCULAR; INTRAVENOUS EVERY 5 MIN PRN
Status: DISCONTINUED | OUTPATIENT
Start: 2025-01-17 | End: 2025-01-17 | Stop reason: HOSPADM

## 2025-01-17 RX ORDER — PROPOFOL 10 MG/ML
INJECTION, EMULSION INTRAVENOUS
Status: DISCONTINUED | OUTPATIENT
Start: 2025-01-17 | End: 2025-01-17 | Stop reason: SDUPTHER

## 2025-01-17 RX ORDER — SODIUM CHLORIDE 9 MG/ML
INJECTION, SOLUTION INTRAVENOUS PRN
Status: DISCONTINUED | OUTPATIENT
Start: 2025-01-17 | End: 2025-01-17 | Stop reason: HOSPADM

## 2025-01-17 RX ORDER — ONDANSETRON 2 MG/ML
INJECTION INTRAMUSCULAR; INTRAVENOUS
Status: DISCONTINUED | OUTPATIENT
Start: 2025-01-17 | End: 2025-01-17 | Stop reason: SDUPTHER

## 2025-01-17 RX ORDER — LIDOCAINE HYDROCHLORIDE 10 MG/ML
1 INJECTION, SOLUTION EPIDURAL; INFILTRATION; INTRACAUDAL; PERINEURAL
Status: DISCONTINUED | OUTPATIENT
Start: 2025-01-18 | End: 2025-01-17 | Stop reason: HOSPADM

## 2025-01-17 RX ORDER — DIPHENHYDRAMINE HYDROCHLORIDE 50 MG/ML
12.5 INJECTION INTRAMUSCULAR; INTRAVENOUS
Status: DISCONTINUED | OUTPATIENT
Start: 2025-01-17 | End: 2025-01-17 | Stop reason: HOSPADM

## 2025-01-17 RX ORDER — PROCHLORPERAZINE EDISYLATE 5 MG/ML
5 INJECTION INTRAMUSCULAR; INTRAVENOUS
Status: DISCONTINUED | OUTPATIENT
Start: 2025-01-17 | End: 2025-01-17 | Stop reason: HOSPADM

## 2025-01-17 RX ORDER — OXYCODONE HYDROCHLORIDE 5 MG/1
5 TABLET ORAL
Status: DISCONTINUED | OUTPATIENT
Start: 2025-01-17 | End: 2025-01-17 | Stop reason: HOSPADM

## 2025-01-17 RX ORDER — FENTANYL CITRATE 50 UG/ML
INJECTION, SOLUTION INTRAMUSCULAR; INTRAVENOUS
Status: DISCONTINUED | OUTPATIENT
Start: 2025-01-17 | End: 2025-01-17 | Stop reason: SDUPTHER

## 2025-01-17 RX ORDER — HYDROCODONE BITARTRATE AND ACETAMINOPHEN 5; 325 MG/1; MG/1
1 TABLET ORAL EVERY 4 HOURS PRN
Qty: 42 TABLET | Refills: 0 | Status: SHIPPED | OUTPATIENT
Start: 2025-01-17 | End: 2025-01-20

## 2025-01-17 RX ORDER — SODIUM CHLORIDE 9 MG/ML
INJECTION, SOLUTION INTRAVENOUS CONTINUOUS
Status: DISCONTINUED | OUTPATIENT
Start: 2025-01-17 | End: 2025-01-17 | Stop reason: HOSPADM

## 2025-01-17 RX ORDER — MIDAZOLAM HYDROCHLORIDE 1 MG/ML
INJECTION, SOLUTION INTRAMUSCULAR; INTRAVENOUS
Status: DISCONTINUED | OUTPATIENT
Start: 2025-01-17 | End: 2025-01-17 | Stop reason: SDUPTHER

## 2025-01-17 RX ADMIN — LIDOCAINE HYDROCHLORIDE 80 MG: 20 INJECTION, SOLUTION EPIDURAL; INFILTRATION; INTRACAUDAL; PERINEURAL at 07:29

## 2025-01-17 RX ADMIN — ONDANSETRON 4 MG: 2 INJECTION, SOLUTION INTRAMUSCULAR; INTRAVENOUS at 08:06

## 2025-01-17 RX ADMIN — MIDAZOLAM 2 MG: 1 INJECTION INTRAMUSCULAR; INTRAVENOUS at 07:24

## 2025-01-17 RX ADMIN — DEXAMETHASONE SODIUM PHOSPHATE 10 MG: 10 INJECTION, SOLUTION INTRAMUSCULAR; INTRAVENOUS at 07:38

## 2025-01-17 RX ADMIN — KETOROLAC TROMETHAMINE 30 MG: 30 INJECTION, SOLUTION INTRAMUSCULAR at 08:11

## 2025-01-17 RX ADMIN — Medication 2000 MG: at 07:35

## 2025-01-17 RX ADMIN — PROPOFOL 200 MG: 10 INJECTION, EMULSION INTRAVENOUS at 07:29

## 2025-01-17 RX ADMIN — SODIUM CHLORIDE, POTASSIUM CHLORIDE, SODIUM LACTATE AND CALCIUM CHLORIDE: 600; 310; 30; 20 INJECTION, SOLUTION INTRAVENOUS at 06:36

## 2025-01-17 RX ADMIN — FENTANYL CITRATE 100 MCG: 50 INJECTION INTRAMUSCULAR; INTRAVENOUS at 07:29

## 2025-01-17 ASSESSMENT — PAIN DESCRIPTION - ORIENTATION
ORIENTATION: LEFT

## 2025-01-17 ASSESSMENT — PAIN DESCRIPTION - DESCRIPTORS
DESCRIPTORS: ACHING;DULL;SHARP
DESCRIPTORS: SORE
DESCRIPTORS: CRAMPING;SORE

## 2025-01-17 ASSESSMENT — LIFESTYLE VARIABLES: SMOKING_STATUS: 1

## 2025-01-17 ASSESSMENT — PAIN - FUNCTIONAL ASSESSMENT
PAIN_FUNCTIONAL_ASSESSMENT: FACE, LEGS, ACTIVITY, CRY, AND CONSOLABILITY (FLACC)
PAIN_FUNCTIONAL_ASSESSMENT: 0-10

## 2025-01-17 ASSESSMENT — PAIN DESCRIPTION - LOCATION
LOCATION: ANKLE

## 2025-01-17 ASSESSMENT — PAIN SCALES - GENERAL
PAINLEVEL_OUTOF10: 3
PAINLEVEL_OUTOF10: 2
PAINLEVEL_OUTOF10: 3

## 2025-01-17 ASSESSMENT — ENCOUNTER SYMPTOMS: SHORTNESS OF BREATH: 0

## 2025-01-17 NOTE — DISCHARGE INSTR - COC
Continuity of Care Form    Patient Name: Justin Smart   :  1995  MRN:  [id number]    Admit date:  2025  Discharge date:  ***    Code Status Order: No Order   Advance Directives:   Advance Care Flowsheet Documentation        Date/Time Healthcare Directive Type of Healthcare Directive Copy in Chart Healthcare Agent Appointed Healthcare Agent's Name Healthcare Agent's Phone Number    25 0625 No, patient does not have an advance directive for healthcare treatment  --  --  --  --  --                     Admitting Physician:  Alvin Ireland DPM  PCP: Migue Corado MD    Discharging Nurse: ***  Discharging Hospital Unit/Room#: [facility] OR Pool/NONE  Discharging Unit Phone Number: ***    Emergency Contact:   Extended Emergency Contact Information  Primary Emergency Contact: Bhavna Smart  Address: [id number] [place] [address]  Home Phone: [phone]  Mobile Phone: [phone]  Relation: Parent  Secondary Emergency Contact: Marichuy Frias  Home Phone: [phone]  Mobile Phone: [phone]  Relation: Girlfriend    Past Surgical History:  Past Surgical History:   Procedure Laterality Date    ANKLE ARTHROSCOPY Left 5/3/2024    LEFT ANKLE ARTHROSCOPY WITH EXTENSIVE DEBRIDEMENT performed by Alvin Ireland DPM at [facility] OR    ANKLE FRACTURE SURGERY Left 5/3/2024    LEFT ANKLE  ORIF, LEFT ANKLE SYNDESMOSIS ORIF performed by Alvin Ireland DPM at [facility] OR    WISDOM TOOTH EXTRACTION         Immunization History:   Immunization History   Administered Date(s) Administered    TDaP, ADACEL (age 10y-64y), BOOSTRIX (age 10y+), IM, 0.5mL 2023       Active Problems:  Patient Active Problem List   Diagnosis Code    Closed trimalleolar fracture of left ankle S82.852A    Acute left ankle pain M25.572    Ankle dislocation, left, initial encounter S93.05XA    Synovitis of left ankle M65.972       Isolation/Infection:   Isolation            No Isolation           Patient Infection Status       None to display            Nurse Assessment:  Last Vital Signs: BP (!) 103/91   Pulse 69   Temp 98.1 °F (36.7 °C)   Resp 15   Ht 1.803 m (5' 11\")   Wt 74.8 kg (165 lb)   SpO2 100%   BMI 23.01 kg/m²     Last documented pain score (0-10 scale): Pain Level: 2  Last Weight:   Wt Readings from Last 1 Encounters:   01/17/25 74.8 kg (165 lb)     Mental Status:  {IP PT MENTAL STATUS:20030}    IV Access:  { KENY IV ACCESS:080829036}    Nursing Mobility/ADLs:  Walking   {CHP DME ADLs:511473462}  Transfer  {CHP DME ADLs:473098611}  Bathing  {CHP DME ADLs:722460373}  Dressing  {CHP DME ADLs:333322184}  Toileting  {CHP DME ADLs:998085068}  Feeding  {CHP DME ADLs:539830570}  Med Admin  {P DME ADLs:442209772}  Med Delivery   { KENY MED Delivery:997199921}    Wound Care Documentation and Therapy:  Incision 01/17/25 Ankle Left;Medial;Lateral (Active)   Dressing Status Clean;Dry;Intact 01/17/25 0831   Dressing/Treatment Ace wrap;Dry dressing 01/17/25 0831   Number of days: 0        Elimination:  Continence:   Bowel: {YES / NO:19727}  Bladder: {YES / NO:19727}  Urinary Catheter: {Urinary Catheter:900420675}   Colostomy/Ileostomy/Ileal Conduit: {YES / NO:19727}       Date of Last BM: ***    Intake/Output Summary (Last 24 hours) at 1/17/2025 0956  Last data filed at 1/17/2025 0951  Gross per 24 hour   Intake 1750 ml   Output 5 ml   Net 1745 ml     No intake/output data recorded.    Safety Concerns:     { KENY Safety Concerns:479121263}    Impairments/Disabilities:      { KENY Impairments/Disabilities:893744199}    Nutrition Therapy:  Current Nutrition Therapy:   { KENY Diet List:795421057}    Routes of Feeding: {CHP DME Other Feedings:619473343}  Liquids: {Slp liquid thickness:73156}  Daily Fluid Restriction: {CHP DME Yes amt example:737773637}  Last Modified Barium Swallow with Video (Video Swallowing Test): {Done Not Done Date:304088012}    Treatments at the Time of Hospital Discharge:

## 2025-01-17 NOTE — OP NOTE
PODIATRY OP NOTE    PATIENT NAME: Justin Smart  YOB: 1995  -  29 y.o. male  MRN: [id number]  DATE: 1/17/2025  BILLING #: [id number]    Surgeon(s):  Alvin Ireland DPM     ASSISTANTS: Danilo Ruiz DPM PGY 3; Flori Stewart D.P.M. PGY 1    PRE-OP DIAGNOSIS:   Painful orthopedic hardware left ankle  History of left ankle fracture    POST-OP DIAGNOSIS: Same as above.    PROCEDURE:   Removal of painful orthopedic hardware x2, left ankle (CPT 81822)    ANESTHESIA: General With local    HEMOSTASIS: Pneumatic thigh tourniquet @275 mmHg for 30 minutes.    ESTIMATED BLOOD LOSS: Less than 10 cc.    MATERIALS:   * No implants in log *    INJECTABLES: 20 cc of 1:1 mix of 0.5% marcaine plain and 1% lidocaine plain    SPECIMEN:   * No specimens in log *    COMPLICATIONS: None    Findings:  Infection Present At Time Of Surgery (PATOS) (choose all levels that have infection present):  No infection present  Other Findings: Painful orthopedic hardware to the left fibula removed.      INDICATIONS FOR PROCEDURES: The patient is a 29-year-old male who has a history of a previous traumatic fractures to his left ankle.  Patient underwent open reduction internal fixation of this fracture and has been following up regularly in the outpatient setting.  Since his initial procedure he has developed increasing pain to the left ankle due to prominent hardware. Pain has been progressing over the past several months to the point of limiting ambulation and activities of daily living. The patient has failed multiple conservative treatment modalities. At this time the patient elects to undergo surgery to remove the painful hardware. All risks and benefits were discussed, no guarantees were given or implied. Consent was obtained and placed in the patient's chart.    PROCEDURE IN DETAIL: The patient was transported from pre-op to the operating room and placed on the operating table in the supine position with a safety strap

## 2025-01-17 NOTE — DISCHARGE INSTR - DIET

## 2025-01-17 NOTE — DISCHARGE INSTRUCTIONS
Foot and Ankle Surgery Post Operative Instructions:  You have had a surgical procedure on your foot/ ankle      Fluids and Diet:  Begin with clear liquids, broth, dry toast, and crackers.  If not nauseated then resume your regular pre-operative diet when you are ready  With your history of diabetes, please lower your intake sugar content food as this will negatively impact surgery.    Medications:  Take your prescriptions as directed  You are receiving new prescriptions  Norco - take 3-4 times daily for pain as needed  You received nerve block to the ankle-this should manage your pain for the first 18hrs post operatively  If your pain is not severe then you may take the non-prescription medication that you normally take for aches and pains ie Tylenol and Ibuprofen (alternating), or if severe pain occurs these will serve as additional medication   You may resume your regularly scheduled medications (unless otherwise directed)  If you have a fracture or a surgery that involves placing hardware (screws, plates, joint replacement), avoid the routine use of NSAIDs for about 6 months if possible (due to a risk of delayed bone healing).  If any side effects or adverse reactions occur, discontinue the medication and contact your doctor.  Review the patient drug information that is provided before you take any medication    Ambulation and Activity:  You are advised to go directly home from the hospital  Use assistance device with either crutches, walker, or knee scooter  We recommend knee scooter if possible, you can also obtain these on [employer] for rather affordable and quickly obtainable.  You may put weight on the operated foot.  You should wear the surgical shoe at all times when awake. Do not excessively walk on the surgical extremity/ foot.  Avoid stairs.  Do not lift or move heavy objects  Do not drive until cleared by your physician    Bandage and Wound Care Instructions:  Keep bandage clean and dry  Do NOT remove

## 2025-01-17 NOTE — H&P
History and Physical Service   [facility]    HISTORY AND PHYSICAL EXAMINATION            Date of Evaluation: 1/17/2025  Patient name:  Justin Smart  MRN:   [id number]  YOB: 1995  PCP:    Migue Corado MD    History Obtained From:     Patient, medical records    History of Present Illness:     This is Justin Smart a 29 y.o. male who presents today for a LEFT ANKLE HARDWARE REMOVAL (FIBULA ONLY) by Alvin Ireland DPM for Pain due to bone fixation device, initial encounter (HCC); Acute left ankl*. Patient reports he fell at home in May 2024 when he heard a pop from his ankle. He was unable to bear weight and presented to the ED. X-ray demonstrated acute left ankle fracture-dislocation. 05/03/24 he underwent ORIF of left ankle, syndesmotic repair, and arthroscopy of left ankle. States he began to have pain not long after surgery. MRI completed and results listed below. Rates his pain 3/10 and describes it as achy, sharp, shooting, and burning. Patient believes his pain is due to the hardware in his ankle. Pain is aggravated with wearing his work boots, walking, sitting and nothing seems to help alleviate his pain. Denies fever, chills, shortness of breath, cough, congestion, wheezing, chest pain, open sores or wounds. Denies hx of diabetes. Denies any current blood thinning medications.     Past Medical History:     Past Medical History:   Diagnosis Date    Ankle fracture     left    Anxiety     Asthma     no current issues, only uses inhaler twice a year    Depression     Lumbar herniated disc         Past Surgical History:     Past Surgical History:   Procedure Laterality Date    ANKLE ARTHROSCOPY Left 5/3/2024    LEFT ANKLE ARTHROSCOPY WITH EXTENSIVE DEBRIDEMENT performed by Alvin Ireland DPM at [facility] OR    ANKLE FRACTURE SURGERY Left 5/3/2024    LEFT ANKLE  ORIF, LEFT ANKLE SYNDESMOSIS ORIF performed by Alvin Ireland DPM at [facility] OR    WISDOM TOOTH  10/22/24  IMPRESSION:  1. Partial tear of the ATFL.  Grade 1 calcaneofibular ligament sprain.  Large  tibiotalar effusion.  Multifocal posttraumatic marrow edema at the distal  tibia, medial malleolus, lateral malleolus and medial talus.  Nondisplaced  fracturing at the posteromedial talus/talar dome.  Mild edema in the  subcutaneous fat about the right ankle.  2. Small posterior subtalar effusion.  3. Mild degenerative change of the talonavicular joint.  4. Low-grade partial split tearing of posterior peroneus brevis tendon.  Mild  peroneus brevis and longus tendinosis.  5. Low-grade partial tearing and mild-to-moderate tendinosis of the posterior  tibialis tendon insertion.     Diagnosis:      Pain due to bone fixation device, initial encounter ([facility]); Acute left ankl*    Plans:     1. LEFT ANKLE HARDWARE REMOVAL (FIBULA ONLY)      TIN Muir CNP  1/17/2025  6:54 AM

## 2025-01-17 NOTE — ANESTHESIA PRE PROCEDURE
Department of Anesthesiology  Preprocedure Note       Name:  Justin Smart   Age:  29 y.o.  :  1995                                          MRN:  [id number]         Date:  2025      Surgeon: Surgeon(s):  Alvin Ireland DPM    Procedure: Procedure(s):  LEFT ANKLE HARDWARE REMOVAL (FIBULA ONLY)    Medications prior to admission:   Prior to Admission medications    Medication Sig Start Date End Date Taking? Authorizing Provider   ketorolac (TORADOL) 10 MG tablet Take 1 tablet by mouth every 6 hours as needed for Pain 10/29/24 10/29/25 Yes Alvin Ireland DPM   albuterol sulfate HFA (PROVENTIL;VENTOLIN;PROAIR) 108 (90 Base) MCG/ACT inhaler Inhale into the lungs 19  Yes Provider, MD Jose   acetaminophen (TYLENOL) 500 MG tablet Take 2 tablets by mouth 3 times daily 24  Yes Adrianna Tejada DPM   ibuprofen (ADVIL;MOTRIN) 800 MG tablet Take 1 tablet by mouth every 8 hours as needed for Pain 24  Sylvain Morales DPM       Current medications:    Current Facility-Administered Medications   Medication Dose Route Frequency Provider Last Rate Last Admin   • [START ON 2025] lidocaine PF 1 % injection 1 mL  1 mL IntraDERmal Once PRN Luci Fisher MD       • 0.9 % sodium chloride infusion   IntraVENous Continuous Luci Fisher MD       • lactated ringers infusion   IntraVENous Continuous Luci Fisher  mL/hr at 25 0636 Restarted at 25 0724   • sodium chloride flush 0.9 % injection 5-40 mL  5-40 mL IntraVENous 2 times per day Luci Fisher MD       • sodium chloride flush 0.9 % injection 5-40 mL  5-40 mL IntraVENous PRN Luci Fisher MD       • 0.9 % sodium chloride infusion   IntraVENous PRN Luci Fisher MD         Facility-Administered Medications Ordered in Other Encounters   Medication Dose Route Frequency Provider Last Rate Last Admin   • dexAMETHasone (PF) (DECADRON)

## 2025-01-17 NOTE — ANESTHESIA POSTPROCEDURE EVALUATION
Department of Anesthesiology  Postprocedure Note    Patient: Justin Smart  MRN: [id number]  YOB: 1995  Date of evaluation: 1/17/2025    Procedure Summary       Date: 01/17/25 Room / Location: [address]    Anesthesia Start: 0724 Anesthesia Stop: 0834    Procedure: LEFT ANKLE HARDWARE REMOVAL (FIBULA ONLY) X 2 (Left: Ankle) Diagnosis:       Pain due to bone fixation device, initial encounter ([facility])      Acute left ankle pain      (Pain due to bone fixation device, initial encounter ([facility]) [T84.84XA])      (Acute left ankle pain [M25.572])    Surgeons: Alvin Ireland DPM Responsible Provider: Ramandeep Rodriguez MD    Anesthesia Type: general ASA Status: 2            Anesthesia Type: No value filed.    Ghassan Phase I: Ghassan Score: 10    Ghassan Phase II: Ghassan Score: 10    Anesthesia Post Evaluation    Airway patency: patent  Cardiovascular status: hemodynamically stable  Respiratory status: acceptable    No notable events documented.

## 2025-01-20 ENCOUNTER — HOSPITAL ENCOUNTER (OUTPATIENT)
Age: 30
Setting detail: SPECIMEN
Discharge: HOME | End: 2025-01-20

## 2025-01-20 ENCOUNTER — OFFICE VISIT (OUTPATIENT)
Dept: FAMILY MEDICINE CLINIC | Age: 30
End: 2025-01-20
Payer: COMMERCIAL

## 2025-01-20 VITALS
HEART RATE: 92 BPM | OXYGEN SATURATION: 100 % | TEMPERATURE: 97.7 F | SYSTOLIC BLOOD PRESSURE: 100 MMHG | BODY MASS INDEX: 22.2 KG/M2 | DIASTOLIC BLOOD PRESSURE: 60 MMHG | WEIGHT: 159.2 LBS

## 2025-01-20 DIAGNOSIS — R53.83 OTHER FATIGUE: Primary | ICD-10-CM

## 2025-01-20 DIAGNOSIS — Z87.09 HISTORY OF ASTHMA: ICD-10-CM

## 2025-01-20 DIAGNOSIS — E55.9 VITAMIN D DEFICIENCY: ICD-10-CM

## 2025-01-20 DIAGNOSIS — Z13.1 SCREENING FOR DIABETES MELLITUS: ICD-10-CM

## 2025-01-20 DIAGNOSIS — Z13.220 SCREENING FOR HYPERLIPIDEMIA: ICD-10-CM

## 2025-01-20 DIAGNOSIS — R53.83 OTHER FATIGUE: ICD-10-CM

## 2025-01-20 DIAGNOSIS — Z98.890 HISTORY OF ANKLE SURGERY: ICD-10-CM

## 2025-01-20 PROBLEM — M65.972 SYNOVITIS OF LEFT ANKLE: Status: RESOLVED | Noted: 2024-05-07 | Resolved: 2025-01-20

## 2025-01-20 PROBLEM — S82.852A CLOSED TRIMALLEOLAR FRACTURE OF LEFT ANKLE: Status: RESOLVED | Noted: 2024-05-07 | Resolved: 2025-01-20

## 2025-01-20 PROBLEM — S93.05XA ANKLE DISLOCATION, LEFT, INITIAL ENCOUNTER: Status: RESOLVED | Noted: 2024-05-07 | Resolved: 2025-01-20

## 2025-01-20 LAB
25(OH)D3 SERPL-MCNC: 12.7 NG/ML (ref 30–100)
ALBUMIN SERPL-MCNC: 4.2 G/DL (ref 3.5–5.2)
ALBUMIN/GLOB SERPL: 1.4 {RATIO} (ref 1–2.5)
ALP SERPL-CCNC: 103 U/L (ref 40–129)
ALT SERPL-CCNC: 16 U/L (ref 10–50)
ANION GAP SERPL CALCULATED.3IONS-SCNC: 11 MMOL/L (ref 9–16)
AST SERPL-CCNC: 21 U/L (ref 10–50)
BILIRUB SERPL-MCNC: 0.5 MG/DL (ref 0–1.2)
BUN SERPL-MCNC: 15 MG/DL (ref 6–20)
CALCIUM SERPL-MCNC: 9.7 MG/DL (ref 8.6–10.4)
CHLORIDE SERPL-SCNC: 104 MMOL/L (ref 98–107)
CHOLEST SERPL-MCNC: 122 MG/DL (ref 0–199)
CHOLESTEROL/HDL RATIO: 3.6
CO2 SERPL-SCNC: 28 MMOL/L (ref 20–31)
CREAT SERPL-MCNC: 1 MG/DL (ref 0.7–1.2)
ERYTHROCYTE [DISTWIDTH] IN BLOOD BY AUTOMATED COUNT: 12 % (ref 11.8–14.4)
EST. AVERAGE GLUCOSE BLD GHB EST-MCNC: 97 MG/DL
GFR, ESTIMATED: >90 ML/MIN/1.73M2
GLUCOSE SERPL-MCNC: 98 MG/DL (ref 74–99)
HBA1C MFR BLD: 5 % (ref 4–6)
HCT VFR BLD AUTO: 45.3 % (ref 40.7–50.3)
HDLC SERPL-MCNC: 34 MG/DL
HGB BLD-MCNC: 15.7 G/DL (ref 13–17)
LDLC SERPL CALC-MCNC: 61 MG/DL (ref 0–100)
MCH RBC QN AUTO: 28.5 PG (ref 25.2–33.5)
MCHC RBC AUTO-ENTMCNC: 34.7 G/DL (ref 28.4–34.8)
MCV RBC AUTO: 82.4 FL (ref 82.6–102.9)
NRBC BLD-RTO: 0 PER 100 WBC
PLATELET # BLD AUTO: 196 K/UL (ref 138–453)
PMV BLD AUTO: 10.5 FL (ref 8.1–13.5)
POTASSIUM SERPL-SCNC: 4.2 MMOL/L (ref 3.7–5.3)
PROT SERPL-MCNC: 7.2 G/DL (ref 6.6–8.7)
RBC # BLD AUTO: 5.5 M/UL (ref 4.21–5.77)
SODIUM SERPL-SCNC: 143 MMOL/L (ref 136–145)
TRIGL SERPL-MCNC: 137 MG/DL (ref 0–149)
TSH SERPL DL<=0.05 MIU/L-ACNC: 2.1 UIU/ML (ref 0.27–4.2)
VLDLC SERPL CALC-MCNC: 27 MG/DL (ref 1–30)
WBC OTHER # BLD: 6.7 K/UL (ref 3.5–11.3)

## 2025-01-20 PROCEDURE — 99204 OFFICE O/P NEW MOD 45 MIN: CPT

## 2025-01-20 SDOH — ECONOMIC STABILITY: FOOD INSECURITY: WITHIN THE PAST 12 MONTHS, YOU WORRIED THAT YOUR FOOD WOULD RUN OUT BEFORE YOU GOT MONEY TO BUY MORE.: NEVER TRUE

## 2025-01-20 SDOH — ECONOMIC STABILITY: FOOD INSECURITY: WITHIN THE PAST 12 MONTHS, THE FOOD YOU BOUGHT JUST DIDN'T LAST AND YOU DIDN'T HAVE MONEY TO GET MORE.: NEVER TRUE

## 2025-01-20 ASSESSMENT — PATIENT HEALTH QUESTIONNAIRE - PHQ9
SUM OF ALL RESPONSES TO PHQ QUESTIONS 1-9: 0
2. FEELING DOWN, DEPRESSED OR HOPELESS: NOT AT ALL
1. LITTLE INTEREST OR PLEASURE IN DOING THINGS: NOT AT ALL
SUM OF ALL RESPONSES TO PHQ9 QUESTIONS 1 & 2: 0

## 2025-01-20 NOTE — ASSESSMENT & PLAN NOTE
He has a history of asthma and keeps an albuterol inhaler on hand, although he has not needed to use it for the past 2 years.

## 2025-01-20 NOTE — PROGRESS NOTES
normal.      Breath sounds: Normal breath sounds.   Abdominal:      Palpations: Abdomen is soft.   Musculoskeletal:      Right lower leg: No edema.      Left lower leg: No edema.          No results found for: \"WBC\", \"HGB\", \"HCT\", \"PLT\", \"CHOL\", \"TRIG\", \"HDL\", \"LDLDIRECT\", \"ALT\", \"AST\", \"NA\", \"K\", \"CL\", \"CREATININE\", \"BUN\", \"CO2\", \"TSH\", \"PSA\", \"INR\", \"GLUF\", \"LABA1C\"  No results found for: \"CALCIUM\", \"PHOS\"  No results found for: \"LDLDIRECT\"  No results found for: \"LABA1C\", \"TRR8HYNT\"           Assessment & Plan  Other fatigue  is last cholesterol check was in 2019 and was normal. He has a family history of Graves' disease and lymphoma. A comprehensive blood work panel will be ordered to assess cholesterol levels, kidney function, liver function, thyroid health, and potential diabetes. A vitamin D level check will also be conducted to ensure adequate bone health. If the results are within normal limits, the tests will be repeated biennially; however, if any abnormalities are detected, annual testing will be implemented.    Orders:    CBC; Future    Comprehensive Metabolic Panel; Future    TSH reflex to FT4; Future    Screening for hyperlipidemia    is last cholesterol check was in 2019 and was normal. He has a family history of Graves' disease and lymphoma. A comprehensive blood work panel will be ordered to assess cholesterol levels, kidney function, liver function, thyroid health, and potential diabetes. A vitamin D level check will also be conducted to ensure adequate bone health. If the results are within normal limits, the tests will be repeated biennially; however, if any abnormalities are detected, annual testing will be implemented.    Orders:    Lipid, Fasting; Future    Screening for diabetes mellitus    is last cholesterol check was in 2019 and was normal. He has a family history of Graves' disease and lymphoma. A comprehensive blood work panel will be ordered to assess cholesterol levels, kidney

## 2025-01-20 NOTE — ASSESSMENT & PLAN NOTE
Chronic, at goal (stable), He had a fibular plate removed recently due to irritation and limited mobility. He reports some swelling post-surgery. He is currently managing pain with Tylenol and is wearing a boot when mobile, elevating the foot when resting. He has a follow-up appointment with his orthopedic surgeon in 3 days., medication adherence emphasized, and lifestyle modifications recommended

## 2025-01-20 NOTE — ASSESSMENT & PLAN NOTE
is last cholesterol check was in 2019 and was normal. He has a family history of Graves' disease and lymphoma. A comprehensive blood work panel will be ordered to assess cholesterol levels, kidney function, liver function, thyroid health, and potential diabetes. A vitamin D level check will also be conducted to ensure adequate bone health. If the results are within normal limits, the tests will be repeated biennially; however, if any abnormalities are detected, annual testing will be implemented.    Orders:    Hemoglobin A1C; Future

## 2025-01-20 NOTE — ASSESSMENT & PLAN NOTE
is last cholesterol check was in 2019 and was normal. He has a family history of Graves' disease and lymphoma. A comprehensive blood work panel will be ordered to assess cholesterol levels, kidney function, liver function, thyroid health, and potential diabetes. A vitamin D level check will also be conducted to ensure adequate bone health. If the results are within normal limits, the tests will be repeated biennially; however, if any abnormalities are detected, annual testing will be implemented.    Orders:    Lipid, Fasting; Future

## 2025-01-20 NOTE — ASSESSMENT & PLAN NOTE
is last cholesterol check was in 2019 and was normal. He has a family history of Graves' disease and lymphoma. A comprehensive blood work panel will be ordered to assess cholesterol levels, kidney function, liver function, thyroid health, and potential diabetes. A vitamin D level check will also be conducted to ensure adequate bone health. If the results are within normal limits, the tests will be repeated biennially; however, if any abnormalities are detected, annual testing will be implemented.    Orders:    CBC; Future    Comprehensive Metabolic Panel; Future    TSH reflex to FT4; Future

## 2025-01-23 ENCOUNTER — OFFICE VISIT (OUTPATIENT)
Dept: PODIATRY | Age: 30
End: 2025-01-23

## 2025-01-23 VITALS — HEIGHT: 71 IN | WEIGHT: 159 LBS | BODY MASS INDEX: 22.26 KG/M2

## 2025-01-23 DIAGNOSIS — Z98.890 POSTOPERATIVE STATE: Primary | ICD-10-CM

## 2025-01-23 PROBLEM — L97.321 NON-PRESSURE CHRONIC ULCER OF LEFT ANKLE, LIMITED TO BREAKDOWN OF SKIN (HCC): Status: ACTIVE | Noted: 2025-01-23

## 2025-01-23 PROBLEM — G89.18 POST-OP PAIN: Status: ACTIVE | Noted: 2025-01-23

## 2025-01-23 PROBLEM — M76.72 PERONEAL TENDONITIS OF LEFT LOWER EXTREMITY: Status: ACTIVE | Noted: 2025-01-23

## 2025-01-23 PROBLEM — T84.84XA PAINFUL ORTHOPAEDIC HARDWARE (HCC): Status: ACTIVE | Noted: 2025-01-23

## 2025-01-23 PROCEDURE — 99024 POSTOP FOLLOW-UP VISIT: CPT | Performed by: PODIATRIST

## 2025-01-23 NOTE — PROGRESS NOTES
[facility] PODIATRY [address]  [address]  [place] [id number]  Dept: [phone]  Dept Fax: [phone]    POST-OP PROGRESS NOTE  Date of patient's visit: 1/23/2025  Patient's Name:  Justin Smart YOB: 1995            Patient Care Team:  Migue Corado MD as PCP - General (Family Medicine)  [facility]  Alvin Ireland DPM as Physician (Podiatry, Foot & Ankle Surgery)        Chief Complaint   Patient presents with    Post-Op Check     Rtc 1 week - LEFT ANKLE HARDWARE REMOVAL (FIBULA ONLY) X 2           Subjective: Justin Smart is a 29 y.o. male who presents to the office today 1 week(s)  S/P LEFT ANKLE HARDWARE REMOVAL (FIBULA ONLY) X 2 for correction of left foot pain and discomfort.  Problem List Items Addressed This Visit    None  Visit Diagnoses       Postoperative state    -  Primary        Patient relates pain is Present and improved.  Pain is rated 2 out of 10 and is described as constant, mild. Currently denies F/C/N/V.  Is patient taking pain medications as prescribed and is controlling pain yes, prn    Patient feels much better to his ankle already after the surgery    Physical Examination:  Incision is coapted, sutures/steri-strips are intact. Minimal bleeding post operatively. Edema present. No erythema. No Pus.   Operative correction is satisfactory.      Radiographs: None      Assessment: Justin Smart is status post as above  Normal post operative course.  Doing well          ICD-10-CM    1. Postoperative state  Z98.890            Plan:  Patient examined and evaluated.  Current condition and treatment options discussed in detail.  Advised pt to his condition.  Sutures will be taken out next week.  Patient is able to weight-bear as tolerated and crutches and neutrality dry sterile dressings were applied today.  Patient is to work on range of motion of his ankle

## 2025-01-30 ENCOUNTER — OFFICE VISIT (OUTPATIENT)
Dept: PODIATRY | Age: 30
End: 2025-01-30

## 2025-01-30 VITALS — HEIGHT: 71 IN | WEIGHT: 159 LBS | BODY MASS INDEX: 22.26 KG/M2

## 2025-01-30 DIAGNOSIS — Z98.890 POSTOPERATIVE STATE: Primary | ICD-10-CM

## 2025-01-30 PROCEDURE — 99024 POSTOP FOLLOW-UP VISIT: CPT | Performed by: PODIATRIST

## 2025-02-06 NOTE — PROGRESS NOTES
[facility] PODIATRY [address]  [address]  [place] [id number]  Dept: [phone]  Dept Fax: [phone]    POST-OP PROGRESS NOTE  Date of patient's visit: 1/30/2025  Patient's Name:  Justin Smart YOB: 1995            Patient Care Team:  Migue Corado MD as PCP - General (Family Medicine)  Migue Corado MD [facility]  Alvin Ireland DPM as Physician (Podiatry, Foot & Ankle Surgery)        Chief Complaint   Patient presents with    Post-Op Check     2 week post op LEFT ANKLE HARDWARE REMOVAL (FIBULA ONLY) X 2    Suture / Staple Removal     Left foot          Subjective: Justin Smart is a 29 y.o. male who presents to the office today 3week(s)  S/P hardware removal, tenolysis and revision of scar with adjacent soft tissue transfer for correction of pain to the ankle  Problem List Items Addressed This Visit    None  . Patient relates pain is absent and improved.  Pain is rated 0 out of 10 and is described as mild. Currently denies F/C/N/V.  Is patient taking pain medications as prescribed and is controlling pain  Patient has an issue putting on his boots currently with the sutures in place    Physical Examination:  Incision is coapted, sutures/steri-strips are intact. Minimal bleeding post operatively. Edema present. No erythema. No Pus.   Operative correction is satisfactory.      Radiographs: none      Assessment: Justin Smart is status post as abovce  Normal post operative course.  Doing well       Diagnosis Orders   1. Postoperative state                Plan:  Patient examined and evaluated.  Current condition and treatment options discussed in detail.  Advised pt to his condtion    Patient presents for suture removal. The wound is well healed without signs of infection.  The sutures are removed and the steri strips applied followed by DSD consisting of 4x4, Kerlix and Ace Wrap.  . Wound

## 2025-02-11 ENCOUNTER — OFFICE VISIT (OUTPATIENT)
Age: 30
End: 2025-02-11
Payer: COMMERCIAL

## 2025-02-11 ENCOUNTER — HOSPITAL ENCOUNTER (OUTPATIENT)
Age: 30
Setting detail: SPECIMEN
Discharge: HOME | End: 2025-02-11

## 2025-02-11 VITALS
HEART RATE: 97 BPM | DIASTOLIC BLOOD PRESSURE: 70 MMHG | WEIGHT: 163.6 LBS | SYSTOLIC BLOOD PRESSURE: 102 MMHG | BODY MASS INDEX: 22.82 KG/M2 | TEMPERATURE: 97.4 F | OXYGEN SATURATION: 100 %

## 2025-02-11 DIAGNOSIS — E55.9 VITAMIN D DEFICIENCY: ICD-10-CM

## 2025-02-11 DIAGNOSIS — F52.0 DECREASED SEXUAL DESIRE: Primary | ICD-10-CM

## 2025-02-11 DIAGNOSIS — F52.0 DECREASED SEXUAL DESIRE: ICD-10-CM

## 2025-02-11 LAB
GH SERPL-MCNC: 1.54 NG/ML (ref 0–2.47)
TESTOST SERPL-MCNC: 503 NG/DL (ref 249–836)

## 2025-02-11 PROCEDURE — 99214 OFFICE O/P EST MOD 30 MIN: CPT

## 2025-02-11 RX ORDER — MULTIVIT-MIN/IRON/FOLIC ACID/K 18-600-40
2000 CAPSULE ORAL DAILY
COMMUNITY

## 2025-02-11 RX ORDER — ASCORBIC ACID 500 MG
500 TABLET ORAL DAILY
COMMUNITY

## 2025-02-11 RX ORDER — CHLORAL HYDRATE 500 MG
CAPSULE ORAL DAILY
COMMUNITY

## 2025-02-11 NOTE — ASSESSMENT & PLAN NOTE
Chronic, at goal (stable), He has started taking vitamin D, vitamin C, B12, fish oil, and iron supplements. The patient was informed that magnesium and vitamin D are the only two over-the-counter medications that provide benefits, while the others may not offer additional advantages but are not harmful. Fish oil, specifically omega-3, is beneficial primarily if triglyceride levels are high, although his levels are within the normal range. His thyroid function is normal. A comprehensive blood workup will be conducted to assess overall health, including kidney, liver, electrolytes, hemoglobin, platelet, white blood cells, cholesterol, thyroid, and sugar levels. He will be contacted with the results. If any abnormalities are detected, further discussion regarding follow-up and subsequent management will be initiated.

## 2025-02-11 NOTE — PROGRESS NOTES
Justin Smart (:  1995) is a 29 y.o. male,NEW patient, here for evaluation of the following chief complaint(s):  Lab Request (Hormone Panel)    History of Present Illness        The patient presents for evaluation of decreased libido.    He reports a persistent decrease in sexual desire, which he has been experiencing since the age of 27. Despite maintaining a stable relationship for the past 2.5 years and having a satisfactory sex life, he continues to experience diminished libido. He does not report any erectile or ejaculatory dysfunction. His supplement regimen includes beta-alanine, caffeine, and vitamins, with no hormonal components. He also reports a lack of muscle growth despite regular gym attendance over the past 4 years. He does not report any penile discharge, fever, chills, or the presence of warts or bumps in the genital area. He occasionally experiences ingrown hairs. He has not undergone any hormone or thyroid screening in the past.    He reports a general improvement in his health status following his last visit. He has initiated a regimen of vitamin D, vitamin C, B12, fish oil, and iron supplements. He has been experiencing fatigue, even after adequate sleep. He has been in a stable relationship for the past 2.5 years and reports no significant stressors. He has been using fish oil to support his joint health due to his frequent gym activities.    MEDICATIONS  Current: vitamin D supplement, vitamin C, B12, fish oil, iron      Note from New patient visit:      He underwent a surgical procedure last Friday for the removal of a fibular plate, which was initially inserted due to a fracture with 5 distinct breaks in 2024. The plate was removed due to irritation and limited mobility. He has a scheduled postoperative follow-up appointment in 3 days with his orthopedic surgeon, Dr. Berger. He is currently managing his pain with Tylenol and prefers not to take any other medications. He

## 2025-02-14 LAB — ANDROSTENEDIONE: 0.56 NG/ML (ref 0.33–1.34)

## 2025-02-19 PROBLEM — Z13.220 SCREENING FOR HYPERLIPIDEMIA: Status: RESOLVED | Noted: 2025-01-20 | Resolved: 2025-02-19

## 2025-02-19 PROBLEM — Z13.1 SCREENING FOR DIABETES MELLITUS: Status: RESOLVED | Noted: 2025-01-20 | Resolved: 2025-02-19

## 2025-06-02 ENCOUNTER — OFFICE VISIT (OUTPATIENT)
Dept: PODIATRY | Age: 30
End: 2025-06-02
Payer: COMMERCIAL

## 2025-06-02 VITALS — HEIGHT: 71 IN | WEIGHT: 163 LBS | BODY MASS INDEX: 22.82 KG/M2

## 2025-06-02 DIAGNOSIS — M25.472 EDEMA OF LEFT ANKLE: Primary | ICD-10-CM

## 2025-06-02 DIAGNOSIS — G25.81 RESTLESS LEG: ICD-10-CM

## 2025-06-02 PROCEDURE — 99214 OFFICE O/P EST MOD 30 MIN: CPT | Performed by: PODIATRIST

## 2025-06-02 RX ORDER — ROPINIROLE 0.5 MG/1
0.5 TABLET, FILM COATED ORAL 2 TIMES DAILY
Qty: 60 TABLET | Refills: 1 | Status: SHIPPED | OUTPATIENT
Start: 2025-06-02

## 2025-06-02 NOTE — PROGRESS NOTES
[facility] PHYSICIANS [facility] PODIATRY  [address] [place], [place] [id number]  Dept: [phone]    RETURN PATIENT PROGRESS NOTE  Date of patient's visit: 6/2/2025  Patient's Name:  Justin Smart YOB: 1995            Patient Care Team:  Migue Corado MD as PCP - General (Family Medicine)  Migue Corado MD as PCP - Empaneled Provider  Alvin Ireland DPM as Physician (Podiatry, Foot & Ankle Surgery)       Justin Smart 29 y.o. male that presents for follow-up of   Chief Complaint   Patient presents with    Ankle Pain     Left ankle     Pt's primary care physician is Migue Corado MD last seen 2 /2025  Symptoms began 4 weeks(s) ago and are unchanged.  Patient relates pain is absent.  Pain is rated 1 out of 10 and is described as mild to his left ankle..  Treatments prior to today's visit include: None.  Currently denies F/C/N/V.       Patient states he wakes up in the middle of the night with his leg shaking and unable to control it.  Shakes like this for a while until he is able to go to sleep  No Known Allergies    Past Medical History:   Diagnosis Date    Ankle dislocation, left, initial encounter 05/07/2024    Ankle fracture     left    Anxiety     Asthma     no current issues, only uses inhaler twice a year    Closed trimalleolar fracture of left ankle 05/07/2024    Depression     Dog bite     Lumbar herniated disc     Synovitis of left ankle 05/07/2024       Prior to Admission medications    Medication Sig Start Date End Date Taking? Authorizing Provider   rOPINIRole (REQUIP) 0.5 MG tablet Take 1 tablet by mouth in the morning and 1 tablet in the evening. 6/2/25  Yes Alvin Ireland DPM   vitamin D 50 MCG (2000 UT) CAPS capsule Take 1 capsule by mouth daily   Yes ProviderJose MD   vitamin C (ASCORBIC ACID) 500 MG tablet Take 1 tablet by mouth daily   Yes Jose Kennedy MD   Omega-3 Fatty Acids (FISH

## (undated) DEVICE — SKIN PREP TRAY W/CHG: Brand: MEDLINE INDUSTRIES, INC.

## (undated) DEVICE — GUIDEWIRE ORTH DIA0.045IN W/ TRCR TIP LSR LN

## (undated) DEVICE — GLOVE SURG SZ 75 CRM LTX FREE POLYISOPRENE POLYMER BEAD ANTI

## (undated) DEVICE — 4-PORT MANIFOLD: Brand: NEPTUNE 2

## (undated) DEVICE — 1016 S-DRAPE IRRIG POUCH 10/BOX: Brand: STERI-DRAPE™

## (undated) DEVICE — TOWEL,OR,DSP,ST,BLUE,DLX,XR,4/PK,20PK/CS: Brand: MEDLINE

## (undated) DEVICE — GLOVE SURG SZ 8 CRM LTX FREE POLYISOPRENE POLYMER BEAD ANTI

## (undated) DEVICE — SCOPE ENDOSCP NANONEEDLE 125 MM

## (undated) DEVICE — NEEDLE SPNL 18GA L3.5IN W/ QNCKE SHARPER BVL DURA CLICK

## (undated) DEVICE — C-ARMOR C-ARM EQUIPMENT COVERS CLEAR STERILE UNIVERSAL FIT 12 PER CASE: Brand: C-ARMOR

## (undated) DEVICE — YANKAUER,FLEXIBLE HANDLE,REGLR CAPACITY: Brand: MEDLINE INDUSTRIES, INC.

## (undated) DEVICE — SOLUTION IRRIG 500ML 0.9% SOD CHLO USP POUR PLAS BTL

## (undated) DEVICE — INTENDED FOR TISSUE SEPARATION, AND OTHER PROCEDURES THAT REQUIRE A SHARP SURGICAL BLADE TO PUNCTURE OR CUT.: Brand: BARD-PARKER ® CARBON RIB-BACK BLADES

## (undated) DEVICE — SHEATH KIT NANONEEDLE 125 MM HI FLO OPERATIVE

## (undated) DEVICE — STAZ LOWER EXTREMITY: Brand: MEDLINE INDUSTRIES, INC.

## (undated) DEVICE — BIT DRL DIA2MM CALIB FOR ANK FRAC MGMT SYS

## (undated) DEVICE — BNDG,ELSTC,MATRIX,STRL,4"X5YD,LF,HOOK&LP: Brand: MEDLINE

## (undated) DEVICE — BIT DRL DIA2.7MM STR CANN FOR MINI COMPR FULL THRD SCR

## (undated) DEVICE — C-ARM: Brand: UNBRANDED

## (undated) DEVICE — GUIDEWIRE ORTH L150MM DIA0.053IN W/ TRCR TIP FOR ANK FRAC

## (undated) DEVICE — SUTURE MONOCRYL SZ 2-0 L27IN ABSRB VLT SH L26MM 1/2 CIR TAPR Y317H

## (undated) DEVICE — PAD,ABDOMINAL,5"X9",ST,LF,25/BX: Brand: MEDLINE INDUSTRIES, INC.

## (undated) DEVICE — BANDAGE COBAN 4 IN COMPR W4INXL5YD FOAM COHESIVE QUIK STK SELF ADH SFT

## (undated) DEVICE — CONNECTOR TBNG WHT PLAS SUCT STR 5IN1 LTWT W/ M CONN

## (undated) DEVICE — ZIMMER® STERILE DISPOSABLE TOURNIQUET CUFF WITH PLC, DUAL PORT, SINGLE BLADDER, 34 IN. (86 CM)

## (undated) DEVICE — TUBING, SUCTION, 1/4" X 12', STRAIGHT: Brand: MEDLINE

## (undated) DEVICE — SUTURE MONOCRYL SZ 3-0 L27IN ABSRB UD L26MM SH 1/2 CIR Y416H

## (undated) DEVICE — ZIMMER® STERILE DISPOSABLE TOURNIQUET CUFF WITH PLC, DUAL PORT, SINGLE BLADDER, 30 IN. (76 CM)

## (undated) DEVICE — SHOE POSTOP M M 7.5-9 WOM 8.5-10 HI ANK SQUARED STRP RIG

## (undated) DEVICE — SUTURE PROL SZ 3-0 L18IN NONABSORBABLE BLU L19MM PS-2 3/8 8687H

## (undated) DEVICE — SOLUTION IRRIG 3000ML 0.9% SOD CHL USP UROMATIC PLAS CONT

## (undated) DEVICE — BIT DRL DIA2.5MM FOR ANK FRAC MGMT SYS

## (undated) DEVICE — SUTURE PROL SZ 4-0 L30IN NONABSORBABLE BLU SH L26MM 1/2 CIR 8831H

## (undated) DEVICE — DRAPE C ARM W/ POLY STRP W42XL72IN FOR MOB XR

## (undated) DEVICE — DRAPE,REIN 53X77,STERILE: Brand: MEDLINE

## (undated) DEVICE — GOWN,AURORA,NONREINFORCED,LARGE: Brand: MEDLINE

## (undated) DEVICE — BIT DRL DIA2.6MM CANN FOR ANK FRAC MGMT SYS

## (undated) DEVICE — SOLUTION IV IRRIG 500ML 0.9% SODIUM CHL 2F7123